# Patient Record
Sex: MALE | Race: BLACK OR AFRICAN AMERICAN | Employment: UNEMPLOYED | URBAN - METROPOLITAN AREA
[De-identification: names, ages, dates, MRNs, and addresses within clinical notes are randomized per-mention and may not be internally consistent; named-entity substitution may affect disease eponyms.]

---

## 2021-07-24 ENCOUNTER — ANCILLARY PROCEDURE (OUTPATIENT)
Dept: GENERAL RADIOLOGY | Facility: CLINIC | Age: 61
End: 2021-07-24
Attending: FAMILY MEDICINE

## 2021-07-24 ENCOUNTER — OFFICE VISIT (OUTPATIENT)
Dept: URGENT CARE | Facility: URGENT CARE | Age: 61
End: 2021-07-24

## 2021-07-24 VITALS
OXYGEN SATURATION: 100 % | SYSTOLIC BLOOD PRESSURE: 146 MMHG | TEMPERATURE: 98.4 F | DIASTOLIC BLOOD PRESSURE: 80 MMHG | HEART RATE: 69 BPM | WEIGHT: 170 LBS

## 2021-07-24 DIAGNOSIS — M25.511 RIGHT SHOULDER PAIN, UNSPECIFIED CHRONICITY: ICD-10-CM

## 2021-07-24 DIAGNOSIS — E11.9 TYPE 2 DIABETES, HBA1C GOAL < 7% (H): ICD-10-CM

## 2021-07-24 DIAGNOSIS — R07.81 RIB PAIN: ICD-10-CM

## 2021-07-24 DIAGNOSIS — R07.9 CHEST PAIN, UNSPECIFIED TYPE: ICD-10-CM

## 2021-07-24 DIAGNOSIS — I10 BENIGN ESSENTIAL HYPERTENSION: ICD-10-CM

## 2021-07-24 DIAGNOSIS — E78.5 HYPERLIPIDEMIA LDL GOAL <100: ICD-10-CM

## 2021-07-24 DIAGNOSIS — M94.0 COSTOCHONDRITIS: ICD-10-CM

## 2021-07-24 PROCEDURE — 71101 X-RAY EXAM UNILAT RIBS/CHEST: CPT | Mod: 59 | Performed by: RADIOLOGY

## 2021-07-24 PROCEDURE — 73030 X-RAY EXAM OF SHOULDER: CPT | Mod: RT | Performed by: RADIOLOGY

## 2021-07-24 PROCEDURE — 99203 OFFICE O/P NEW LOW 30 MIN: CPT | Performed by: FAMILY MEDICINE

## 2021-07-24 PROCEDURE — 93000 ELECTROCARDIOGRAM COMPLETE: CPT | Performed by: FAMILY MEDICINE

## 2021-07-24 RX ORDER — ATORVASTATIN CALCIUM 10 MG/1
10 TABLET, FILM COATED ORAL DAILY
Status: ON HOLD | COMMUNITY
Start: 2021-07-24 | End: 2021-07-27

## 2021-07-24 RX ORDER — CYCLOBENZAPRINE HCL 5 MG
5 TABLET ORAL
Qty: 20 TABLET | Refills: 0 | Status: SHIPPED | OUTPATIENT
Start: 2021-07-24

## 2021-07-24 RX ORDER — METOPROLOL TARTRATE 50 MG
50 TABLET ORAL 2 TIMES DAILY
Status: ON HOLD | COMMUNITY
Start: 2021-07-24 | End: 2021-07-27

## 2021-07-24 NOTE — PROGRESS NOTES
Patient presents with:  RIGHT CHEST PAIN: CHEST PRESSURE IN RIGHT SIDE NOW PAIN RADIATING DOWN HIS RT ARM TO FORE ARM BLOOD PRESSURE IS ELEVATED PT WAS GIVEN ASA AND BLOOD PRESSURE WAS BETTER BUT STILL ELEVATED YESTERDAY AND PATIENT IS STILL HAVING SXS WITH BLOOD PRESSURE ELEVATION PT IS TAKING  MEDICATION WHICH IS PRESCRIBED THROUGH [place] LOTS OF THESE MEDICATION AND WITH OTHER VITAMIN COMBINATIONS AND OTHER DOSING THAT JAMES DO NOT HAVE        Subjective     Sabino Jerry is a 60 year old male who presents to clinic today for the following health issues:    HPI     Chest Pain      Onset: yesterday am     Description (location/character/radiation/duration): pain in right side of chest     No history of in past , no h/o injury or heavy lifiting     Checked bp at home -164/92-before he took his bp medication     Intensity:  mild    Accompanying signs and symptoms:        Shortness of breath: no        Sweating: no        Nausea/vomitting: no        Palpitations: no        Other (fevers/chills/cough/heartburn/lightheadedness): YES- cough for 3 months, no evaluation in [place]   Covid vaccine Pfizer, second dose may 1st week in [facility]    History (similar episodes/previous evaluation): None    Precipitating or alleviating factors:       Worse with exertion- with movement of arm on right: slightly worse with walking 6/10   Rest improves 0/10 at rest        Worse with breathing: no        Related to eating: no        Better with burping: no     Therapies tried and outcome: ASA 81 mg , took garlic and home medications       Patient is from [place], visiting with family, due to return to [place] next week ,son in law here with patient.  Declined , son in law is translating for patient     Patient Active Problem List   Diagnosis     Past Surgical History:   Procedure Laterality Date     CV HEART CATHETERIZATION WITH POSSIBLE INTERVENTION N/A 7/27/2021    Procedure: coronary angiogram;  Surgeon: Roberto  Joshua RAYMOND MD;  Location:  HEART CARDIAC CATH LAB     CV LEFT HEART CATH N/A 7/27/2021    Procedure: Left Heart Cath;  Surgeon: Joshua Mejia MD;  Location:  HEART CARDIAC CATH LAB     CV PCI ANGIOPLASTY N/A 7/27/2021    Procedure: Percutaneous Transluminal Angioplasty;  Surgeon: Joshua Mejia MD;  Location:  HEART CARDIAC CATH LAB       Social History     Tobacco Use     Smoking status: Never Smoker     Smokeless tobacco: Never Used   Substance Use Topics     Alcohol use: Not on file     No family history on file.        Current Outpatient Medications   Medication Sig Dispense Refill     cyclobenzaprine (FLEXERIL) 5 MG tablet Take 1 tablet (5 mg) by mouth at bedtime as needed, may repeat once for muscle spasms 20 tablet 0     diclofenac (VOLTAREN) 1 % topical gel Apply 2 g topically 4 times daily 100 g 0     aspirin (ASA) 81 MG EC tablet Take 1 tablet (81 mg) by mouth daily 30 tablet 0     clopidogrel (PLAVIX) 75 MG tablet Take 1 tablet (75 mg) by mouth daily 30 tablet 0     HOLD MEDICATION IN ORDER SET 1 each daily (Patient not taking: Reported on 8/3/2021)       isosorbide mononitrate (IMDUR) 30 MG 24 hr tablet Take 2 tablets (60 mg) by mouth daily 60 tablet 1     metFORMIN (GLUCOPHAGE) 850 MG tablet Take 1 tablet (850 mg) by mouth daily (with dinner)       metoprolol succinate ER (TOPROL-XL) 25 MG 24 hr tablet Take 1 tablet (25 mg) by mouth every evening 30 tablet 4     nitroGLYcerin (NITROSTAT) 0.4 MG sublingual tablet For chest pain place 1 tablet under the tongue every 5 minutes for 3 doses. If symptoms persist 5 minutes after 1st dose call 911. 15 tablet 1     NONFORMULARY Take 1 tablet by mouth daily Pt's combo pill from [place]: atorvastatin 10 mg + fenofibrate 160 mg       NONFORMULARY Take 1 tablet by mouth daily Pt's combo pill from [place]: methylcobolamin 1500 mcg + alpha lipoic acid 100 mg + pyridoxine 3 mg + folic acid 1.5 mg       NONFORMULARY Take 1 tablet by mouth daily Pt's combo pill  from [place]: telmisartan 40 mg + metoprolol succinate 47.5 mg       Allergies   Allergen Reactions     Penicillins              ROS are negative, except as otherwise noted HPI    bp today without his bp meds       Objective    BP (!) 146/80   Pulse 69   Temp 98.4  F (36.9  C)   Wt 77.1 kg (170 lb)   SpO2 100%   There is no height or weight on file to calculate BMI.  Physical Exam   GENERAL: healthy, alert and no distress  NECK: no adenopathy, no asymmetry, masses, or scars and thyroid normal to palpation  RESP: lungs clear to auscultation - no rales, rhonchi or wheezes  Chest wall tenderness to palpation right side of chest, reproduces the pain  CV: regular rate and rhythm, normal S1 S2, no S3 or S4, no murmur, click or rub,   MS: no pretibial  edema   R shoulder-tender to palpation anterior joint and over AC joint but no deformity  reproduces pain, pain with abduction of shoulder, pain and decrease rom internal and external, no gross deformity of shoulder   NEURO: Normal strength and tone, mentation intact and speech normal, normal gait       Diagnostic Test Results:  Labs reviewed in Epic  Results for orders placed or performed in visit on 07/24/21   XR Shoulder Right G/E 3 Views     Status: None    Narrative    SHOULDER RIGHT THREE OR MORE VIEWS   7/24/2021 10:34 AM     HISTORY: Right shoulder pain, unspecified chronicity.    COMPARISON: None.      Impression    IMPRESSION: Mild-to-moderate acromioclavicular degenerative changes.  Glenohumeral joint is unremarkable. No evidence of acute fracture.    CELSO ASHLEY MD         SYSTEM ID:  [id number]   Results for orders placed or performed in visit on 07/24/21   XR Ribs & Chest Right G/E 3 Views     Status: None    Narrative    RIBS AND CHEST RIGHT THREE VIEWS  7/24/2021 10:31 AM     HISTORY: Right rib pain.    COMPARISON: None.      Impression    IMPRESSION: Cardiomediastinal silhouette is normal. Atherosclerotic  calcification of the aorta. No definite evidence  of rib fracture.  Surgical clips right upper quadrant likely due to previous  cholecystectomy.    CELSO ASHLEY MD         SYSTEM ID:  [id number]              EKG Interpretatio n:      Interpreted by Gwen Gee MD  Time reviewed:10:00  Symptoms at time of EKG: right sided chest wall and shoulder pain    Rhythm: Normal sinus   Rate: Normal  Ectopy Premature ventricular contractions   Conduction: Normal  ST Segments/ T Waves:  No acute  ST-T wave changes and No acute ischemic changes  Q Waves: None  Comparison to prior: No old EKG available    Clinical Impression: no acute changes        ASSESSMENT/PLAN:      ICD-10-CM    1. Right shoulder pain, unspecified chronicity  M25.511 XR Shoulder Right G/E 3 Views     diclofenac (VOLTAREN) 1 % topical gel     cyclobenzaprine (FLEXERIL) 5 MG tablet    mild to moc   2. Rib pain  R07.81 XR Ribs & Chest Right G/E 3 Views     cyclobenzaprine (FLEXERIL) 5 MG tablet   3. Costochondritis  M94.0    4. Chest pain  R07.9 EKG 12-lead complete w/read - Clinics   5. Benign essential hypertension  I10 DISCONTINUED: metoprolol tartrate (LOPRESSOR) 50 MG tablet   6. Type 2 diabetes, HbA1c goal < 7% (H)  E11.9    7. Hyperlipidemia LDL goal <100  E78.5 DISCONTINUED: atorvastatin (LIPITOR) 10 MG tablet         Reviewed medication instructions and side effects. Follow up if experiences side effects.     I reviewed supportive care, otc meds to use if needed, expected course, and signs of concern.  Follow up as needed or if he does not improve within 1 -2 day(s) or if worsens in any way.  Reviewed red flag symptoms and is to go to the ER if experiences any of these.           On the day of the encounter, time spend on chart review, patient visit, review of testing, documentation and discussion with other providers was 40  minutes      Patient Instructions     Start voltaren gel to right shoulder rub into area of pain  4 times a day     Ok to use heat/ice to area which ever feel better to  help with pain    Take tylenol 1000 mg 3 times a day ( max of 3000 mg a day ) for pain take scheduled not as needed to try to get pain under control    For rib pain-heat/ice to area    Take the tylenol as noted, can try the voltaren gel to the area as well    For night time take the cyclobenzaprine-muscle relaxer, will make you sleepy at bedtime will help with pain in ribs and shoulder    If pain is worse, shortness of breath, pain on left side of chest, headaches to ER     Check blood pressures at least an hour or more after his blood pressure medications, if consistently over 140 on top and over 90 on bottom, keep follow up appointment with your son in law PCP     /blood pressure >180 on the top/>100 on the bottom, to ER     no better after a week, follow up in clinic    Patient Education     Shoulder Pain with Uncertain Cause   Shoulder pain can have many causes. Pain often comes from the structures that surround the shoulder joint. These are the joint capsule, ligaments, tendons, muscles, and bursa. Pain can also come from cartilage in the joint. Cartilage can become worn out or injured. It s important to know what s causing your pain so the healthcare provider can use the correct treatment. But sometimes it s difficult to find the exact cause of shoulder pain. You may need to see a specialist (orthopedist). You may also need special tests such as a CT scan or MRI. The provider may need to use special tools to look inside the joint (arthroscopy).  Shoulder pain can be treated with a sling or a device that keeps your shoulder from moving. You can take an anti-inflammatory medicine such as ibuprofen to ease pain. You may need to do special shoulder exercises. Follow up with a specialist if the pain is severe or doesn t go away after a few weeks.  Home care  Follow these tips when caring for yourself at home:    If a sling was given to you, leave it in place for the time advised by your healthcare provider. If you  aren t sure how long to wear it, ask for advice. If the sling becomes loose, adjust it so that your forearm is level with the ground. Your shoulder should feel well supported.    Put an ice pack on the injured area for 20 minutes every 1 to 2 hours the first day. You can make your own ice pack by putting ice cubes in a plastic bag. Wrap the bag in a thin towel. Continue with ice packs 3 to 4 times a day for the next 2 days. Then use the pack as needed to ease pain and swelling.    You may use acetaminophen or ibuprofen to control pain, unless another pain medicine was prescribed. If you have chronic liver or kidney disease, talk with your healthcare provider before using these medicines. Also talk with your provider if you ve ever had a stomach ulcer or digestive bleeding.    Shoulder pain may seem worse at night, when there is less to distract you from the pain. If you sleep on your side, try to keep weight off your painful shoulder. Propping pillows behind you may stop you from rolling over onto that shoulder during sleep.     Shoulder and elbow joints can become stiff if left in a sling for too long. You should start range of motion exercises about 7 to 10 days after the injury. Talk with your provider to find out what type of exercises to do and how soon to start.    You can take the sling off to shower or bathe.  Follow-up care  Follow up with your healthcare provider if you don t start to get better in the next 5 days.  When to seek medical advice  Call your healthcare provider right away if any of these occur:    Pain or swelling gets worse or continues for more than a few days    Your hand or fingers become cold, blue, numb, or tingly    Large amount of bruising on your shoulder or upper arm    Trouble moving your hand or fingers    Weakness in your hand or fingers    Your shoulder becomes stiff    It feels like your shoulder is popping out    You are less able to do your daily activities  [employer] last  reviewed this educational content on 1/1/2020 2000-2021 [employer]. All rights reserved. This information is not intended as a substitute for professional medical care. Always follow your healthcare professional's instructions.                 Patient Education     Chest Wall Pain: Costochondritis    The chest pain that you have had today is caused by costochondritis. This condition is caused by an inflammation of the cartilage joining your ribs to your breastbone. It's not caused by heart or lung problems. Your healthcare team has made sure that the chest pain you feel is not from a life threatening cause of chest pain such as heart attack, collapsed lung, blood clot in the lung, tear in the aorta, or esophageal rupture. The inflammation may have been brought on by a blow to the chest, lifting heavy objects, intense exercise, or an illness that made you cough and sneeze a lot. It often occurs during times of emotional stress. It can be painful, but it's not dangerous. It usually goes away in 1 to 2 weeks. But it may happen again. Rarely, a more serious condition may cause symptoms similar to costochondritis. That s why it s important to watch for the warning signs listed below.   Home care  Follow these guidelines when caring for yourself at home:    If you feel that emotional stress is a cause of your condition, try to figure out the sources of that stress. It may not be obvious. Learn ways to deal with the stress in your life. This can include regular exercise, muscle relaxation, meditation, or simply taking time out for yourself.    You may use acetaminophen, ibuprofen, or naproxen to control pain, unless another pain medicine was prescribed. If you have liver or kidney disease or ever had a stomach ulcer, talk with your healthcare provider before using these medicines.    You can also help ease pain by using a hot, wet compress or heating pad. Use this with or without a medicated skin cream  that helps relieves pain.    Do stretching exercise as advised by your provider. Typically rest is beneficial for the first few days. Avoid strenuous activity that worsens the pain.    Take any prescribed medicines as directed.  Follow-up care  Follow up with your healthcare provider, or as advised.   When to seek medical advice  Call your healthcare provider right away if any of these occur:    A change in the type of pain. Call if it feels different, becomes more serious, lasts longer, or spreads into your shoulder, arm, neck, jaw, or back.    Shortness of breath or pain gets worse when you breathe    Weakness, dizziness, or fainting    Cough with dark-colored sputum (phlegm) or blood    Abdominal pain    Dark red or black stools    Fever of 100.4 F (38 C) or higher, or as directed by your healthcare provider  Sean last reviewed this educational content on 6/1/2019 2000-2021 [employer]. All rights reserved. This information is not intended as a substitute for professional medical care. Always follow your healthcare professional's instructions.

## 2021-07-24 NOTE — PATIENT INSTRUCTIONS
Start voltaren gel to right shoulder rub into area of pain  4 times a day     Ok to use heat/ice to area which ever feel better to help with pain    Take tylenol 1000 mg 3 times a day ( max of 3000 mg a day ) for pain take scheduled not as needed to try to get pain under control    For rib pain-heat/ice to area    Take the tylenol as noted, can try the voltaren gel to the area as well    For night time take the cyclobenzaprine-muscle relaxer, will make you sleepy at bedtime will help with pain in ribs and shoulder    If pain is worse, shortness of breath, pain on left side of chest, headaches to ER     Check blood pressures at least an hour or more after his blood pressure medications, if consistently over 140 on top and over 90 on bottom, keep follow up appointment with your son in law PCP     /blood pressure >180 on the top/>100 on the bottom, to ER     no better after a week, follow up in clinic    Patient Education     Shoulder Pain with Uncertain Cause   Shoulder pain can have many causes. Pain often comes from the structures that surround the shoulder joint. These are the joint capsule, ligaments, tendons, muscles, and bursa. Pain can also come from cartilage in the joint. Cartilage can become worn out or injured. It s important to know what s causing your pain so the healthcare provider can use the correct treatment. But sometimes it s difficult to find the exact cause of shoulder pain. You may need to see a specialist (orthopedist). You may also need special tests such as a CT scan or MRI. The provider may need to use special tools to look inside the joint (arthroscopy).  Shoulder pain can be treated with a sling or a device that keeps your shoulder from moving. You can take an anti-inflammatory medicine such as ibuprofen to ease pain. You may need to do special shoulder exercises. Follow up with a specialist if the pain is severe or doesn t go away after a few weeks.  Home care  Follow these tips when  caring for yourself at home:    If a sling was given to you, leave it in place for the time advised by your healthcare provider. If you aren t sure how long to wear it, ask for advice. If the sling becomes loose, adjust it so that your forearm is level with the ground. Your shoulder should feel well supported.    Put an ice pack on the injured area for 20 minutes every 1 to 2 hours the first day. You can make your own ice pack by putting ice cubes in a plastic bag. Wrap the bag in a thin towel. Continue with ice packs 3 to 4 times a day for the next 2 days. Then use the pack as needed to ease pain and swelling.    You may use acetaminophen or ibuprofen to control pain, unless another pain medicine was prescribed. If you have chronic liver or kidney disease, talk with your healthcare provider before using these medicines. Also talk with your provider if you ve ever had a stomach ulcer or digestive bleeding.    Shoulder pain may seem worse at night, when there is less to distract you from the pain. If you sleep on your side, try to keep weight off your painful shoulder. Propping pillows behind you may stop you from rolling over onto that shoulder during sleep.     Shoulder and elbow joints can become stiff if left in a sling for too long. You should start range of motion exercises about 7 to 10 days after the injury. Talk with your provider to find out what type of exercises to do and how soon to start.    You can take the sling off to shower or bathe.  Follow-up care  Follow up with your healthcare provider if you don t start to get better in the next 5 days.  When to seek medical advice  Call your healthcare provider right away if any of these occur:    Pain or swelling gets worse or continues for more than a few days    Your hand or fingers become cold, blue, numb, or tingly    Large amount of bruising on your shoulder or upper arm    Trouble moving your hand or fingers    Weakness in your hand or fingers    Your  shoulder becomes stiff    It feels like your shoulder is popping out    You are less able to do your daily activities  [employer] last reviewed this educational content on 1/1/2020 2000-2021 [employer]. All rights reserved. This information is not intended as a substitute for professional medical care. Always follow your healthcare professional's instructions.                 Patient Education     Chest Wall Pain: Costochondritis    The chest pain that you have had today is caused by costochondritis. This condition is caused by an inflammation of the cartilage joining your ribs to your breastbone. It's not caused by heart or lung problems. Your healthcare team has made sure that the chest pain you feel is not from a life threatening cause of chest pain such as heart attack, collapsed lung, blood clot in the lung, tear in the aorta, or esophageal rupture. The inflammation may have been brought on by a blow to the chest, lifting heavy objects, intense exercise, or an illness that made you cough and sneeze a lot. It often occurs during times of emotional stress. It can be painful, but it's not dangerous. It usually goes away in 1 to 2 weeks. But it may happen again. Rarely, a more serious condition may cause symptoms similar to costochondritis. That s why it s important to watch for the warning signs listed below.   Home care  Follow these guidelines when caring for yourself at home:    If you feel that emotional stress is a cause of your condition, try to figure out the sources of that stress. It may not be obvious. Learn ways to deal with the stress in your life. This can include regular exercise, muscle relaxation, meditation, or simply taking time out for yourself.    You may use acetaminophen, ibuprofen, or naproxen to control pain, unless another pain medicine was prescribed. If you have liver or kidney disease or ever had a stomach ulcer, talk with your healthcare provider before using these  medicines.    You can also help ease pain by using a hot, wet compress or heating pad. Use this with or without a medicated skin cream that helps relieves pain.    Do stretching exercise as advised by your provider. Typically rest is beneficial for the first few days. Avoid strenuous activity that worsens the pain.    Take any prescribed medicines as directed.  Follow-up care  Follow up with your healthcare provider, or as advised.   When to seek medical advice  Call your healthcare provider right away if any of these occur:    A change in the type of pain. Call if it feels different, becomes more serious, lasts longer, or spreads into your shoulder, arm, neck, jaw, or back.    Shortness of breath or pain gets worse when you breathe    Weakness, dizziness, or fainting    Cough with dark-colored sputum (phlegm) or blood    Abdominal pain    Dark red or black stools    Fever of 100.4 F (38 C) or higher, or as directed by your healthcare provider  Sean last reviewed this educational content on 6/1/2019 2000-2021 [employer]. All rights reserved. This information is not intended as a substitute for professional medical care. Always follow your healthcare professional's instructions.

## 2021-07-27 ENCOUNTER — HOSPITAL ENCOUNTER (INPATIENT)
Facility: CLINIC | Age: 61
LOS: 1 days | Discharge: HOME | DRG: 251 | End: 2021-07-28
Attending: EMERGENCY MEDICINE | Admitting: INTERNAL MEDICINE

## 2021-07-27 ENCOUNTER — APPOINTMENT (OUTPATIENT)
Dept: CARDIOLOGY | Facility: CLINIC | Age: 61
DRG: 251 | End: 2021-07-27
Attending: EMERGENCY MEDICINE

## 2021-07-27 ENCOUNTER — APPOINTMENT (OUTPATIENT)
Dept: GENERAL RADIOLOGY | Facility: CLINIC | Age: 61
DRG: 251 | End: 2021-07-27
Attending: EMERGENCY MEDICINE

## 2021-07-27 DIAGNOSIS — I30.9 ACUTE PERICARDITIS, UNSPECIFIED TYPE: ICD-10-CM

## 2021-07-27 DIAGNOSIS — E11.00 TYPE 2 DIABETES MELLITUS WITH HYPEROSMOLARITY WITHOUT COMA, UNSPECIFIED WHETHER LONG TERM INSULIN USE (H): Primary | ICD-10-CM

## 2021-07-27 DIAGNOSIS — I21.4 NSTEMI (NON-ST ELEVATED MYOCARDIAL INFARCTION) (H): ICD-10-CM

## 2021-07-27 LAB
ACT BLD: 155 SECONDS (ref 74–150)
ACT BLD: 239 SECONDS (ref 74–150)
ACT BLD: 284 SECONDS (ref 74–150)
ANION GAP SERPL CALCULATED.3IONS-SCNC: 6 MMOL/L (ref 3–14)
ATRIAL RATE - MUSE: 69 BPM
BASOPHILS # BLD AUTO: 0.1 10E3/UL (ref 0–0.2)
BASOPHILS # BLD AUTO: 0.1 10E3/UL (ref 0–0.2)
BASOPHILS NFR BLD AUTO: 0 %
BASOPHILS NFR BLD AUTO: 1 %
BUN SERPL-MCNC: 24 MG/DL (ref 7–30)
CALCIUM SERPL-MCNC: 9.6 MG/DL (ref 8.5–10.1)
CHLORIDE BLD-SCNC: 105 MMOL/L (ref 94–109)
CHOLEST SERPL-MCNC: 148 MG/DL
CO2 SERPL-SCNC: 24 MMOL/L (ref 20–32)
CREAT SERPL-MCNC: 1.35 MG/DL (ref 0.66–1.25)
CRP SERPL-MCNC: 25.5 MG/L (ref 0–8)
D DIMER PPP FEU-MCNC: 0.32 UG/ML FEU (ref 0–0.5)
DIASTOLIC BLOOD PRESSURE - MUSE: NORMAL MMHG
EOSINOPHIL # BLD AUTO: 0.1 10E3/UL (ref 0–0.7)
EOSINOPHIL # BLD AUTO: 0.2 10E3/UL (ref 0–0.7)
EOSINOPHIL NFR BLD AUTO: 1 %
EOSINOPHIL NFR BLD AUTO: 2 %
ERYTHROCYTE [DISTWIDTH] IN BLOOD BY AUTOMATED COUNT: 14.5 % (ref 10–15)
ERYTHROCYTE [DISTWIDTH] IN BLOOD BY AUTOMATED COUNT: 14.6 % (ref 10–15)
ERYTHROCYTE [DISTWIDTH] IN BLOOD BY AUTOMATED COUNT: 14.6 % (ref 10–15)
ERYTHROCYTE [SEDIMENTATION RATE] IN BLOOD BY WESTERGREN METHOD: 18 MM/HR (ref 0–20)
FASTING STATUS PATIENT QL REPORTED: YES
GFR SERPL CREATININE-BSD FRML MDRD: 56 ML/MIN/1.73M2
GLUCOSE BLD-MCNC: 121 MG/DL (ref 70–99)
GLUCOSE BLDC GLUCOMTR-MCNC: 166 MG/DL (ref 70–99)
GLUCOSE BLDC GLUCOMTR-MCNC: 71 MG/DL (ref 70–99)
HCT VFR BLD AUTO: 34.7 % (ref 40–53)
HCT VFR BLD AUTO: 38.9 % (ref 40–53)
HCT VFR BLD AUTO: 40.8 % (ref 40–53)
HDLC SERPL-MCNC: 38 MG/DL
HGB BLD-MCNC: 10.9 G/DL (ref 13.3–17.7)
HGB BLD-MCNC: 12.3 G/DL (ref 13.3–17.7)
HGB BLD-MCNC: 12.9 G/DL (ref 13.3–17.7)
HOLD SPECIMEN: NORMAL
IMM GRANULOCYTES # BLD: 0 10E3/UL
IMM GRANULOCYTES # BLD: 0.1 10E3/UL
IMM GRANULOCYTES NFR BLD: 0 %
IMM GRANULOCYTES NFR BLD: 0 %
INTERPRETATION ECG - MUSE: NORMAL
LDLC SERPL CALC-MCNC: 89 MG/DL
LVEF ECHO: NORMAL
LYMPHOCYTES # BLD AUTO: 2 10E3/UL (ref 0.8–5.3)
LYMPHOCYTES # BLD AUTO: 2 10E3/UL (ref 0.8–5.3)
LYMPHOCYTES NFR BLD AUTO: 13 %
LYMPHOCYTES NFR BLD AUTO: 20 %
MCH RBC QN AUTO: 27.8 PG (ref 26.5–33)
MCH RBC QN AUTO: 27.9 PG (ref 26.5–33)
MCH RBC QN AUTO: 27.9 PG (ref 26.5–33)
MCHC RBC AUTO-ENTMCNC: 31.4 G/DL (ref 31.5–36.5)
MCHC RBC AUTO-ENTMCNC: 31.6 G/DL (ref 31.5–36.5)
MCHC RBC AUTO-ENTMCNC: 31.6 G/DL (ref 31.5–36.5)
MCV RBC AUTO: 88 FL (ref 78–100)
MCV RBC AUTO: 88 FL (ref 78–100)
MCV RBC AUTO: 89 FL (ref 78–100)
MONOCYTES # BLD AUTO: 0.8 10E3/UL (ref 0–1.3)
MONOCYTES # BLD AUTO: 1.1 10E3/UL (ref 0–1.3)
MONOCYTES NFR BLD AUTO: 7 %
MONOCYTES NFR BLD AUTO: 8 %
NEUTROPHILS # BLD AUTO: 12.7 10E3/UL (ref 1.6–8.3)
NEUTROPHILS # BLD AUTO: 6.8 10E3/UL (ref 1.6–8.3)
NEUTROPHILS NFR BLD AUTO: 69 %
NEUTROPHILS NFR BLD AUTO: 79 %
NONHDLC SERPL-MCNC: 110 MG/DL
NRBC # BLD AUTO: 0 10E3/UL
NRBC # BLD AUTO: 0 10E3/UL
NRBC BLD AUTO-RTO: 0 /100
NRBC BLD AUTO-RTO: 0 /100
P AXIS - MUSE: 22 DEGREES
PLATELET # BLD AUTO: 370 10E3/UL (ref 150–450)
PLATELET # BLD AUTO: 377 10E3/UL (ref 150–450)
PLATELET # BLD AUTO: 391 10E3/UL (ref 150–450)
POTASSIUM BLD-SCNC: 4.4 MMOL/L (ref 3.4–5.3)
PR INTERVAL - MUSE: 146 MS
QRS DURATION - MUSE: 82 MS
QT - MUSE: 390 MS
QTC - MUSE: 417 MS
R AXIS - MUSE: -3 DEGREES
RBC # BLD AUTO: 3.92 10E6/UL (ref 4.4–5.9)
RBC # BLD AUTO: 4.41 10E6/UL (ref 4.4–5.9)
RBC # BLD AUTO: 4.63 10E6/UL (ref 4.4–5.9)
SARS-COV-2 RNA RESP QL NAA+PROBE: NEGATIVE
SODIUM SERPL-SCNC: 135 MMOL/L (ref 133–144)
SYSTOLIC BLOOD PRESSURE - MUSE: NORMAL MMHG
T AXIS - MUSE: 5 DEGREES
TRIGL SERPL-MCNC: 105 MG/DL
TROPONIN I SERPL-MCNC: 3.24 UG/L (ref 0–0.04)
TROPONIN I SERPL-MCNC: 3.44 UG/L (ref 0–0.04)
TROPONIN I SERPL-MCNC: 3.63 UG/L (ref 0–0.04)
TROPONIN I SERPL-MCNC: 3.71 UG/L (ref 0–0.04)
VENTRICULAR RATE- MUSE: 69 BPM
WBC # BLD AUTO: 10 10E3/UL (ref 4–11)
WBC # BLD AUTO: 10.9 10E3/UL (ref 4–11)
WBC # BLD AUTO: 16 10E3/UL (ref 4–11)

## 2021-07-27 PROCEDURE — 96376 TX/PRO/DX INJ SAME DRUG ADON: CPT

## 2021-07-27 PROCEDURE — 250N000013 HC RX MED GY IP 250 OP 250 PS 637: Performed by: PHYSICIAN ASSISTANT

## 2021-07-27 PROCEDURE — C9803 HOPD COVID-19 SPEC COLLECT: HCPCS

## 2021-07-27 PROCEDURE — 85027 COMPLETE CBC AUTOMATED: CPT | Performed by: INTERNAL MEDICINE

## 2021-07-27 PROCEDURE — 93308 TTE F-UP OR LMTD: CPT | Mod: 26 | Performed by: INTERNAL MEDICINE

## 2021-07-27 PROCEDURE — 36415 COLL VENOUS BLD VENIPUNCTURE: CPT | Performed by: PHYSICIAN ASSISTANT

## 2021-07-27 PROCEDURE — C1769 GUIDE WIRE: HCPCS | Performed by: INTERNAL MEDICINE

## 2021-07-27 PROCEDURE — 36415 COLL VENOUS BLD VENIPUNCTURE: CPT | Performed by: INTERNAL MEDICINE

## 2021-07-27 PROCEDURE — 85025 COMPLETE CBC W/AUTO DIFF WBC: CPT | Performed by: PHYSICIAN ASSISTANT

## 2021-07-27 PROCEDURE — 84484 ASSAY OF TROPONIN QUANT: CPT | Performed by: INTERNAL MEDICINE

## 2021-07-27 PROCEDURE — 250N000013 HC RX MED GY IP 250 OP 250 PS 637: Performed by: INTERNAL MEDICINE

## 2021-07-27 PROCEDURE — 93325 DOPPLER ECHO COLOR FLOW MAPG: CPT | Mod: 26 | Performed by: INTERNAL MEDICINE

## 2021-07-27 PROCEDURE — 87635 SARS-COV-2 COVID-19 AMP PRB: CPT | Performed by: EMERGENCY MEDICINE

## 2021-07-27 PROCEDURE — 99223 1ST HOSP IP/OBS HIGH 75: CPT | Mod: AI | Performed by: PHYSICIAN ASSISTANT

## 2021-07-27 PROCEDURE — 250N000011 HC RX IP 250 OP 636: Performed by: INTERNAL MEDICINE

## 2021-07-27 PROCEDURE — C1887 CATHETER, GUIDING: HCPCS | Performed by: INTERNAL MEDICINE

## 2021-07-27 PROCEDURE — 36415 COLL VENOUS BLD VENIPUNCTURE: CPT | Performed by: EMERGENCY MEDICINE

## 2021-07-27 PROCEDURE — 120N000001 HC R&B MED SURG/OB

## 2021-07-27 PROCEDURE — 85379 FIBRIN DEGRADATION QUANT: CPT | Performed by: EMERGENCY MEDICINE

## 2021-07-27 PROCEDURE — 80061 LIPID PANEL: CPT | Performed by: INTERNAL MEDICINE

## 2021-07-27 PROCEDURE — 71046 X-RAY EXAM CHEST 2 VIEWS: CPT

## 2021-07-27 PROCEDURE — 86140 C-REACTIVE PROTEIN: CPT | Performed by: EMERGENCY MEDICINE

## 2021-07-27 PROCEDURE — 80048 BASIC METABOLIC PNL TOTAL CA: CPT | Performed by: EMERGENCY MEDICINE

## 2021-07-27 PROCEDURE — 99152 MOD SED SAME PHYS/QHP 5/>YRS: CPT | Mod: 59 | Performed by: INTERNAL MEDICINE

## 2021-07-27 PROCEDURE — B2111ZZ FLUOROSCOPY OF MULTIPLE CORONARY ARTERIES USING LOW OSMOLAR CONTRAST: ICD-10-PCS | Performed by: INTERNAL MEDICINE

## 2021-07-27 PROCEDURE — 99223 1ST HOSP IP/OBS HIGH 75: CPT | Mod: 25 | Performed by: INTERNAL MEDICINE

## 2021-07-27 PROCEDURE — 85652 RBC SED RATE AUTOMATED: CPT | Performed by: EMERGENCY MEDICINE

## 2021-07-27 PROCEDURE — 93458 L HRT ARTERY/VENTRICLE ANGIO: CPT | Mod: 26 | Performed by: INTERNAL MEDICINE

## 2021-07-27 PROCEDURE — 85347 COAGULATION TIME ACTIVATED: CPT

## 2021-07-27 PROCEDURE — 250N000009 HC RX 250: Performed by: INTERNAL MEDICINE

## 2021-07-27 PROCEDURE — 84484 ASSAY OF TROPONIN QUANT: CPT | Performed by: PHYSICIAN ASSISTANT

## 2021-07-27 PROCEDURE — 99285 EMERGENCY DEPT VISIT HI MDM: CPT | Mod: 25

## 2021-07-27 PROCEDURE — 250N000011 HC RX IP 250 OP 636: Performed by: EMERGENCY MEDICINE

## 2021-07-27 PROCEDURE — 93321 DOPPLER ECHO F-UP/LMTD STD: CPT | Mod: 26 | Performed by: INTERNAL MEDICINE

## 2021-07-27 PROCEDURE — 272N000001 HC OR GENERAL SUPPLY STERILE: Performed by: INTERNAL MEDICINE

## 2021-07-27 PROCEDURE — 96366 THER/PROPH/DIAG IV INF ADDON: CPT

## 2021-07-27 PROCEDURE — 93308 TTE F-UP OR LMTD: CPT

## 2021-07-27 PROCEDURE — 92920 PRQ TRLUML C ANGIOP 1ART&/BR: CPT | Performed by: INTERNAL MEDICINE

## 2021-07-27 PROCEDURE — 93458 L HRT ARTERY/VENTRICLE ANGIO: CPT | Performed by: INTERNAL MEDICINE

## 2021-07-27 PROCEDURE — 84484 ASSAY OF TROPONIN QUANT: CPT | Performed by: EMERGENCY MEDICINE

## 2021-07-27 PROCEDURE — 92920 PRQ TRLUML C ANGIOP 1ART&/BR: CPT | Mod: LD | Performed by: INTERNAL MEDICINE

## 2021-07-27 PROCEDURE — 93325 DOPPLER ECHO COLOR FLOW MAPG: CPT

## 2021-07-27 PROCEDURE — 99152 MOD SED SAME PHYS/QHP 5/>YRS: CPT | Performed by: INTERNAL MEDICINE

## 2021-07-27 PROCEDURE — 85025 COMPLETE CBC W/AUTO DIFF WBC: CPT | Performed by: EMERGENCY MEDICINE

## 2021-07-27 PROCEDURE — 02703ZZ DILATION OF CORONARY ARTERY, ONE ARTERY, PERCUTANEOUS APPROACH: ICD-10-PCS | Performed by: INTERNAL MEDICINE

## 2021-07-27 PROCEDURE — 96365 THER/PROPH/DIAG IV INF INIT: CPT

## 2021-07-27 PROCEDURE — 99153 MOD SED SAME PHYS/QHP EA: CPT | Performed by: INTERNAL MEDICINE

## 2021-07-27 PROCEDURE — 4A023N7 MEASUREMENT OF CARDIAC SAMPLING AND PRESSURE, LEFT HEART, PERCUTANEOUS APPROACH: ICD-10-PCS | Performed by: INTERNAL MEDICINE

## 2021-07-27 PROCEDURE — 255N000002 HC RX 255 OP 636: Performed by: EMERGENCY MEDICINE

## 2021-07-27 PROCEDURE — C1725 CATH, TRANSLUMIN NON-LASER: HCPCS | Performed by: INTERNAL MEDICINE

## 2021-07-27 PROCEDURE — C1760 CLOSURE DEV, VASC: HCPCS | Performed by: INTERNAL MEDICINE

## 2021-07-27 PROCEDURE — 250N000013 HC RX MED GY IP 250 OP 250 PS 637: Performed by: EMERGENCY MEDICINE

## 2021-07-27 PROCEDURE — 93005 ELECTROCARDIOGRAM TRACING: CPT

## 2021-07-27 RX ORDER — SODIUM CHLORIDE 9 MG/ML
INJECTION, SOLUTION INTRAVENOUS CONTINUOUS
Status: ACTIVE | OUTPATIENT
Start: 2021-07-27 | End: 2021-07-28

## 2021-07-27 RX ORDER — HEPARIN SODIUM 1000 [USP'U]/ML
INJECTION, SOLUTION INTRAVENOUS; SUBCUTANEOUS
Status: DISCONTINUED
Start: 2021-07-27 | End: 2021-07-27 | Stop reason: HOSPADM

## 2021-07-27 RX ORDER — ASPIRIN 81 MG/1
81 TABLET ORAL DAILY
Status: DISCONTINUED | OUTPATIENT
Start: 2021-07-28 | End: 2021-07-28 | Stop reason: HOSPADM

## 2021-07-27 RX ORDER — HEPARIN SODIUM 10000 [USP'U]/100ML
0-5000 INJECTION, SOLUTION INTRAVENOUS CONTINUOUS
Status: DISCONTINUED | OUTPATIENT
Start: 2021-07-27 | End: 2021-07-27

## 2021-07-27 RX ORDER — DEXTROSE MONOHYDRATE 25 G/50ML
25-50 INJECTION, SOLUTION INTRAVENOUS
Status: DISCONTINUED | OUTPATIENT
Start: 2021-07-27 | End: 2021-07-28 | Stop reason: HOSPADM

## 2021-07-27 RX ORDER — NALOXONE HYDROCHLORIDE 0.4 MG/ML
0.4 INJECTION, SOLUTION INTRAMUSCULAR; INTRAVENOUS; SUBCUTANEOUS
Status: ACTIVE | OUTPATIENT
Start: 2021-07-27 | End: 2021-07-28

## 2021-07-27 RX ORDER — HYDRALAZINE HYDROCHLORIDE 20 MG/ML
10 INJECTION INTRAMUSCULAR; INTRAVENOUS EVERY 4 HOURS PRN
Status: DISCONTINUED | OUTPATIENT
Start: 2021-07-27 | End: 2021-07-28 | Stop reason: HOSPADM

## 2021-07-27 RX ORDER — CLOPIDOGREL BISULFATE 75 MG/1
75 TABLET ORAL DAILY
Status: DISCONTINUED | OUTPATIENT
Start: 2021-07-28 | End: 2021-07-28 | Stop reason: HOSPADM

## 2021-07-27 RX ORDER — AMOXICILLIN 250 MG
1 CAPSULE ORAL 2 TIMES DAILY PRN
Status: DISCONTINUED | OUTPATIENT
Start: 2021-07-27 | End: 2021-07-28 | Stop reason: HOSPADM

## 2021-07-27 RX ORDER — NALOXONE HYDROCHLORIDE 0.4 MG/ML
0.2 INJECTION, SOLUTION INTRAMUSCULAR; INTRAVENOUS; SUBCUTANEOUS
Status: ACTIVE | OUTPATIENT
Start: 2021-07-27 | End: 2021-07-28

## 2021-07-27 RX ORDER — MORPHINE SULFATE 2 MG/ML
1 INJECTION, SOLUTION INTRAMUSCULAR; INTRAVENOUS
Status: DISCONTINUED | OUTPATIENT
Start: 2021-07-27 | End: 2021-07-28 | Stop reason: HOSPADM

## 2021-07-27 RX ORDER — ASPIRIN 81 MG/1
324 TABLET, CHEWABLE ORAL ONCE
Status: DISCONTINUED | OUTPATIENT
Start: 2021-07-27 | End: 2021-07-27

## 2021-07-27 RX ORDER — OXYCODONE HYDROCHLORIDE 5 MG/1
5 TABLET ORAL ONCE
Status: COMPLETED | OUTPATIENT
Start: 2021-07-27 | End: 2021-07-27

## 2021-07-27 RX ORDER — CLOPIDOGREL BISULFATE 75 MG/1
300 TABLET ORAL ONCE
Status: COMPLETED | OUTPATIENT
Start: 2021-07-27 | End: 2021-07-27

## 2021-07-27 RX ORDER — ACETAMINOPHEN 325 MG/1
650 TABLET ORAL EVERY 4 HOURS PRN
Status: DISCONTINUED | OUTPATIENT
Start: 2021-07-27 | End: 2021-07-27

## 2021-07-27 RX ORDER — NALOXONE HYDROCHLORIDE 0.4 MG/ML
0.2 INJECTION, SOLUTION INTRAMUSCULAR; INTRAVENOUS; SUBCUTANEOUS
Status: DISCONTINUED | OUTPATIENT
Start: 2021-07-27 | End: 2021-07-27

## 2021-07-27 RX ORDER — ONDANSETRON 4 MG/1
4 TABLET, ORALLY DISINTEGRATING ORAL EVERY 6 HOURS PRN
Status: DISCONTINUED | OUTPATIENT
Start: 2021-07-27 | End: 2021-07-27

## 2021-07-27 RX ORDER — OXYCODONE HYDROCHLORIDE 5 MG/1
10 TABLET ORAL EVERY 4 HOURS PRN
Status: DISCONTINUED | OUTPATIENT
Start: 2021-07-27 | End: 2021-07-28 | Stop reason: HOSPADM

## 2021-07-27 RX ORDER — METOPROLOL TARTRATE 25 MG/1
25 TABLET, FILM COATED ORAL 2 TIMES DAILY
Status: DISCONTINUED | OUTPATIENT
Start: 2021-07-27 | End: 2021-07-28

## 2021-07-27 RX ORDER — FENTANYL CITRATE 50 UG/ML
25 INJECTION, SOLUTION INTRAMUSCULAR; INTRAVENOUS
Status: DISCONTINUED | OUTPATIENT
Start: 2021-07-27 | End: 2021-07-28 | Stop reason: HOSPADM

## 2021-07-27 RX ORDER — NITROGLYCERIN 5 MG/ML
VIAL (ML) INTRAVENOUS
Status: DISCONTINUED
Start: 2021-07-27 | End: 2021-07-27 | Stop reason: HOSPADM

## 2021-07-27 RX ORDER — METOPROLOL TARTRATE 25 MG/1
25 TABLET, FILM COATED ORAL 2 TIMES DAILY
Status: DISCONTINUED | OUTPATIENT
Start: 2021-07-27 | End: 2021-07-27

## 2021-07-27 RX ORDER — NITROGLYCERIN 5 MG/ML
VIAL (ML) INTRAVENOUS
Status: DISCONTINUED | OUTPATIENT
Start: 2021-07-27 | End: 2021-07-27 | Stop reason: HOSPADM

## 2021-07-27 RX ORDER — OXYCODONE HYDROCHLORIDE 5 MG/1
5 TABLET ORAL EVERY 4 HOURS PRN
Status: DISCONTINUED | OUTPATIENT
Start: 2021-07-27 | End: 2021-07-27

## 2021-07-27 RX ORDER — ONDANSETRON 4 MG/1
4 TABLET, ORALLY DISINTEGRATING ORAL EVERY 6 HOURS PRN
Status: DISCONTINUED | OUTPATIENT
Start: 2021-07-27 | End: 2021-07-28 | Stop reason: HOSPADM

## 2021-07-27 RX ORDER — ROSUVASTATIN CALCIUM 20 MG/1
40 TABLET, COATED ORAL AT BEDTIME
Status: DISCONTINUED | OUTPATIENT
Start: 2021-07-27 | End: 2021-07-28 | Stop reason: HOSPADM

## 2021-07-27 RX ORDER — NITROGLYCERIN 0.4 MG/1
0.4 TABLET SUBLINGUAL EVERY 5 MIN PRN
Status: DISCONTINUED | OUTPATIENT
Start: 2021-07-27 | End: 2021-07-28 | Stop reason: HOSPADM

## 2021-07-27 RX ORDER — ACETAMINOPHEN 650 MG/1
650 SUPPOSITORY RECTAL EVERY 6 HOURS PRN
Status: DISCONTINUED | OUTPATIENT
Start: 2021-07-27 | End: 2021-07-28 | Stop reason: HOSPADM

## 2021-07-27 RX ORDER — METOPROLOL TARTRATE 50 MG
50 TABLET ORAL 2 TIMES DAILY
Status: DISCONTINUED | OUTPATIENT
Start: 2021-07-27 | End: 2021-07-27

## 2021-07-27 RX ORDER — ONDANSETRON 2 MG/ML
4 INJECTION INTRAMUSCULAR; INTRAVENOUS EVERY 6 HOURS PRN
Status: DISCONTINUED | OUTPATIENT
Start: 2021-07-27 | End: 2021-07-28 | Stop reason: HOSPADM

## 2021-07-27 RX ORDER — ASPIRIN 81 MG/1
81 TABLET, CHEWABLE ORAL DAILY
Status: DISCONTINUED | OUTPATIENT
Start: 2021-07-28 | End: 2021-07-27

## 2021-07-27 RX ORDER — ASPIRIN 325 MG
325 TABLET ORAL ONCE
Status: DISCONTINUED | OUTPATIENT
Start: 2021-07-27 | End: 2021-07-27

## 2021-07-27 RX ORDER — AMOXICILLIN 250 MG
2 CAPSULE ORAL 2 TIMES DAILY PRN
Status: DISCONTINUED | OUTPATIENT
Start: 2021-07-27 | End: 2021-07-28 | Stop reason: HOSPADM

## 2021-07-27 RX ORDER — IOPAMIDOL 755 MG/ML
INJECTION, SOLUTION INTRAVASCULAR
Status: DISCONTINUED | OUTPATIENT
Start: 2021-07-27 | End: 2021-07-27 | Stop reason: HOSPADM

## 2021-07-27 RX ORDER — ACETAMINOPHEN 325 MG/1
650 TABLET ORAL EVERY 6 HOURS PRN
Status: DISCONTINUED | OUTPATIENT
Start: 2021-07-27 | End: 2021-07-28 | Stop reason: HOSPADM

## 2021-07-27 RX ORDER — SODIUM CHLORIDE 9 MG/ML
INJECTION, SOLUTION INTRAVENOUS CONTINUOUS
Status: CANCELLED | OUTPATIENT
Start: 2021-07-27

## 2021-07-27 RX ORDER — NALOXONE HYDROCHLORIDE 0.4 MG/ML
0.4 INJECTION, SOLUTION INTRAMUSCULAR; INTRAVENOUS; SUBCUTANEOUS
Status: DISCONTINUED | OUTPATIENT
Start: 2021-07-27 | End: 2021-07-27

## 2021-07-27 RX ORDER — FLUMAZENIL 0.1 MG/ML
0.2 INJECTION, SOLUTION INTRAVENOUS
Status: ACTIVE | OUTPATIENT
Start: 2021-07-27 | End: 2021-07-28

## 2021-07-27 RX ORDER — HEPARIN SODIUM 10000 [USP'U]/100ML
0-5000 INJECTION, SOLUTION INTRAVENOUS CONTINUOUS
Status: DISCONTINUED | OUTPATIENT
Start: 2021-07-27 | End: 2021-07-28

## 2021-07-27 RX ORDER — SODIUM CHLORIDE 9 MG/ML
INJECTION, SOLUTION INTRAVENOUS CONTINUOUS
Status: ACTIVE | OUTPATIENT
Start: 2021-07-27 | End: 2021-07-27

## 2021-07-27 RX ORDER — FENTANYL CITRATE 50 UG/ML
INJECTION, SOLUTION INTRAMUSCULAR; INTRAVENOUS
Status: DISCONTINUED | OUTPATIENT
Start: 2021-07-27 | End: 2021-07-27 | Stop reason: HOSPADM

## 2021-07-27 RX ORDER — LIDOCAINE 40 MG/G
CREAM TOPICAL
Status: CANCELLED | OUTPATIENT
Start: 2021-07-27

## 2021-07-27 RX ORDER — POTASSIUM CHLORIDE 1500 MG/1
20 TABLET, EXTENDED RELEASE ORAL
Status: CANCELLED | OUTPATIENT
Start: 2021-07-27

## 2021-07-27 RX ORDER — METOPROLOL TARTRATE 1 MG/ML
5-10 INJECTION, SOLUTION INTRAVENOUS
Status: DISCONTINUED | OUTPATIENT
Start: 2021-07-27 | End: 2021-07-28 | Stop reason: HOSPADM

## 2021-07-27 RX ORDER — ATROPINE SULFATE 0.1 MG/ML
0.5 INJECTION INTRAVENOUS
Status: ACTIVE | OUTPATIENT
Start: 2021-07-27 | End: 2021-07-28

## 2021-07-27 RX ORDER — LISINOPRIL 2.5 MG/1
2.5 TABLET ORAL DAILY
Status: DISCONTINUED | OUTPATIENT
Start: 2021-07-28 | End: 2021-07-28

## 2021-07-27 RX ORDER — LIDOCAINE 40 MG/G
CREAM TOPICAL
Status: DISCONTINUED | OUTPATIENT
Start: 2021-07-27 | End: 2021-07-28 | Stop reason: HOSPADM

## 2021-07-27 RX ORDER — NICOTINE POLACRILEX 4 MG
15-30 LOZENGE BUCCAL
Status: DISCONTINUED | OUTPATIENT
Start: 2021-07-27 | End: 2021-07-28 | Stop reason: HOSPADM

## 2021-07-27 RX ORDER — HEPARIN SODIUM 1000 [USP'U]/ML
INJECTION, SOLUTION INTRAVENOUS; SUBCUTANEOUS
Status: DISCONTINUED | OUTPATIENT
Start: 2021-07-27 | End: 2021-07-27 | Stop reason: HOSPADM

## 2021-07-27 RX ORDER — ISOSORBIDE MONONITRATE 30 MG/1
30 TABLET, EXTENDED RELEASE ORAL DAILY
Status: DISCONTINUED | OUTPATIENT
Start: 2021-07-27 | End: 2021-07-28 | Stop reason: HOSPADM

## 2021-07-27 RX ORDER — ASPIRIN 81 MG/1
243 TABLET, CHEWABLE ORAL ONCE
Status: DISCONTINUED | OUTPATIENT
Start: 2021-07-27 | End: 2021-07-27

## 2021-07-27 RX ORDER — OXYCODONE HYDROCHLORIDE 5 MG/1
5 TABLET ORAL EVERY 4 HOURS PRN
Status: DISCONTINUED | OUTPATIENT
Start: 2021-07-27 | End: 2021-07-28 | Stop reason: HOSPADM

## 2021-07-27 RX ORDER — ASPIRIN 81 MG/1
81 TABLET, CHEWABLE ORAL ONCE
Status: DISCONTINUED | OUTPATIENT
Start: 2021-07-27 | End: 2021-07-27

## 2021-07-27 RX ORDER — FENTANYL CITRATE 50 UG/ML
INJECTION, SOLUTION INTRAMUSCULAR; INTRAVENOUS
Status: DISCONTINUED
Start: 2021-07-27 | End: 2021-07-27 | Stop reason: HOSPADM

## 2021-07-27 RX ORDER — LORAZEPAM 0.5 MG/1
0.5 TABLET ORAL
Status: CANCELLED | OUTPATIENT
Start: 2021-07-27

## 2021-07-27 RX ORDER — ONDANSETRON 2 MG/ML
4 INJECTION INTRAMUSCULAR; INTRAVENOUS EVERY 6 HOURS PRN
Status: DISCONTINUED | OUTPATIENT
Start: 2021-07-27 | End: 2021-07-27

## 2021-07-27 RX ORDER — ASPIRIN 325 MG
325 TABLET ORAL ONCE
Status: COMPLETED | OUTPATIENT
Start: 2021-07-27 | End: 2021-07-27

## 2021-07-27 RX ORDER — LORAZEPAM 2 MG/ML
0.5 INJECTION INTRAMUSCULAR
Status: CANCELLED | OUTPATIENT
Start: 2021-07-27

## 2021-07-27 RX ORDER — LIDOCAINE HYDROCHLORIDE 10 MG/ML
INJECTION, SOLUTION EPIDURAL; INFILTRATION; INTRACAUDAL; PERINEURAL
Status: DISCONTINUED
Start: 2021-07-27 | End: 2021-07-27 | Stop reason: HOSPADM

## 2021-07-27 RX ADMIN — HEPARIN SODIUM 950 UNITS/HR: 10000 INJECTION, SOLUTION INTRAVENOUS at 11:13

## 2021-07-27 RX ADMIN — ROSUVASTATIN CALCIUM 40 MG: 20 TABLET, FILM COATED ORAL at 23:01

## 2021-07-27 RX ADMIN — HUMAN ALBUMIN MICROSPHERES AND PERFLUTREN 3 ML: 10; .22 INJECTION, SOLUTION INTRAVENOUS at 12:15

## 2021-07-27 RX ADMIN — HEPARIN SODIUM 950 UNITS/HR: 10000 INJECTION, SOLUTION INTRAVENOUS at 23:01

## 2021-07-27 RX ADMIN — METOPROLOL TARTRATE 25 MG: 25 TABLET, FILM COATED ORAL at 20:58

## 2021-07-27 RX ADMIN — CLOPIDOGREL BISULFATE 300 MG: 75 TABLET ORAL at 23:01

## 2021-07-27 RX ADMIN — ISOSORBIDE MONONITRATE 30 MG: 30 TABLET, EXTENDED RELEASE ORAL at 18:26

## 2021-07-27 RX ADMIN — ASPIRIN 325 MG ORAL TABLET 325 MG: 325 PILL ORAL at 10:00

## 2021-07-27 RX ADMIN — OXYCODONE HYDROCHLORIDE 5 MG: 5 TABLET ORAL at 23:05

## 2021-07-27 RX ADMIN — NITROGLYCERIN 0.4 MG: 0.4 TABLET SUBLINGUAL at 21:00

## 2021-07-27 RX ADMIN — OXYCODONE HYDROCHLORIDE 5 MG: 5 TABLET ORAL at 10:00

## 2021-07-27 ASSESSMENT — ENCOUNTER SYMPTOMS
SHORTNESS OF BREATH: 1
COUGH: 0
FATIGUE: 0
FEVER: 0
VOMITING: 0
NAUSEA: 0
PALPITATIONS: 0
CHILLS: 0

## 2021-07-27 ASSESSMENT — MIFFLIN-ST. JEOR: SCORE: 1538.81

## 2021-07-27 ASSESSMENT — ACTIVITIES OF DAILY LIVING (ADL): ADLS_ACUITY_SCORE: 17

## 2021-07-27 NOTE — ED TRIAGE NOTES
A&O x4, ABCs intact. Pt presents with right sided chest pain and SOB. Pt states that it hurts when he takes a deep breath.

## 2021-07-27 NOTE — ED NOTES
[facility]  ED Nurse Handoff Report    Sabino Jerry is a 61 year old male   ED Chief complaint: Chest Pain and Shortness of Breath  . ED Diagnosis:   Final diagnoses:   NSTEMI (non-ST elevated myocardial infarction) (H)   Acute pericarditis, unspecified type     Allergies:   Allergies   Allergen Reactions     Penicillins        Code Status: Full Code  Activity level - Baseline/Home:  Independent. Activity Level - Current:   Assist X 1. Lift room needed: No. Bariatric: No   Needed: Yes - Tamil, son-in-law here and interpreting.  Isolation: No. Infection: Not Applicable.     Vital Signs:   Vitals:    07/27/21 1140 07/27/21 1145 07/27/21 1150 07/27/21 1200   BP:  (!) 141/67  129/69   Pulse: 64 69 65 64   Resp:       Temp:       TempSrc:       SpO2: 100%  98% 100%   Weight:           Cardiac Rhythm:  ,   Cardiac  Cardiac Rhythm: Normal sinus rhythm  Pain level:    Patient confused: No. Patient Falls Risk: Yes.   Elimination Status: Due to void.    Patient Report - Initial Complaint: Chest pain. Focused Assessment: Pt presents to ED with anterior right chest pain that is worse with deep breaths. Throughout stay in ED, pain changed to epigastric chest pain. Troponin elevated at 3.632. Vitals have been stable throughout ED stay. Cardiac doctor here to speak with patient, and sounds like pt will have angiogram today. Pt A&OX4. Heparin gtt started in ED, and aspirin 324 mg and oxycodone given.  Of note, pt is visiting from [place].   Tests Performed: Labs, EKG, chest xray, echocardiogram. Abnormal Results:   Labs Ordered and Resulted from Time of ED Arrival Up to the Time of Departure from the ED   BASIC METABOLIC PANEL - Abnormal; Notable for the following components:       Result Value    Creatinine 1.35 (*)     Glucose 121 (*)     GFR Estimate 56 (*)     All other components within normal limits   TROPONIN I - Abnormal; Notable for the following components:    Troponin I 3.632 (*)     All  other components within normal limits   CBC WITH PLATELETS AND DIFFERENTIAL - Abnormal; Notable for the following components:    Hemoglobin 12.3 (*)     Hematocrit 38.9 (*)     All other components within normal limits   D DIMER QUANTITATIVE - Normal    Narrative:     This D-dimer assay is intended for use in conjunction with a clinical pretest probability assessment model to exclude pulmonary embolism (PE) and deep venous thrombosis (DVT) in outpatients suspected of PE or DVT. The cut-off value is 0.50 ug/mL FEU.   EXTRA BLUE TOP TUBE   EXTRA RED TOP TUBE   EXTRA GREEN TOP (LITHIUM HEPARIN) TUBE   CBC WITH PLATELETS   SARS-COV2 (COVID-19) VIRUS RT-PCR   CRP INFLAMMATION   ERYTHROCYTE SEDIMENTATION RATE AUTO   MEASURE WEIGHT   NOTIFY PHYSICIAN   NOTIFY PHYSICIAN   COVID-19 VIRUS (CORONAVIRUS) BY PCR    Narrative:     The following orders were created for panel order Asymptomatic COVID-19 Virus (Coronavirus) by PCR Nasopharyngeal.  Procedure                               Abnormality         Status                     ---------                               -----------         ------                     SARS-COV2 (COVID-19) Vir...[[id number]]                      In process                   Please view results for these tests on the individual orders.   CBC WITH PLATELETS & DIFFERENTIAL    Narrative:     The following orders were created for panel order CBC + differential.  Procedure                               Abnormality         Status                     ---------                               -----------         ------                     CBC with platelets and d...[[id number]]  Abnormal            Final result                 Please view results for these tests on the individual orders.   EXTRA TUBE    Narrative:     The following orders were created for panel order [place] Draw.  Procedure                               Abnormality         Status                     ---------                                -----------         ------                     Extra Blue Top Tube[[id number]]                              Final result               Extra Red Top Tube[[id number]]                               Final result               Extra Green Top (Lithium...[[id number]]                      Final result                 Please view results for these tests on the individual orders.     Chest XR,  PA & LAT   Preliminary Result   IMPRESSION: Mild opacity at the left lung base medially in the   retrocardiac region could be related to atelectasis and/or pneumonia.   Mild scarring and/or linear atelectasis in the right lower lung is   unchanged. The lungs are otherwise clear. Heart size and pulmonary   vascularity are within normal limits. Aortic calcification. No pleural   effusions.      Echocardiogram Limited    (Results Pending)   Cardiac Catheterization    (Results Pending)     .   Treatments provided: Heparin gtt, 324mg PO aspirin, PO oxycodone  Family Comments: Son-in-law here  OBS brochure/video discussed/provided to patient:  N/A  ED Medications:   Medications   heparin 25,000 units in 0.45% NaCl 250 mL ANTICOAGULANT infusion (950 Units/hr Intravenous New Bag 7/27/21 1113)   aspirin (ASA) tablet 325 mg (has no administration in time range)     Or   aspirin (ASA) chewable tablet 243 mg (has no administration in time range)   metoprolol tartrate (LOPRESSOR) tablet 25 mg (has no administration in time range)   rosuvastatin (CRESTOR) tablet 40 mg (has no administration in time range)   aspirin (ASA) tablet 325 mg (325 mg Oral Given 7/27/21 1000)   oxyCODONE (ROXICODONE) tablet 5 mg (5 mg Oral Given 7/27/21 1000)   heparin loading dose for LOW INTENSITY TREATMENT * Give BEFORE starting heparin infusion (4,700 Units Intravenous Given 7/27/21 1112)   perflutren diluted 1mL to 2mL with saline (OPTISON) diluted injection 3 mL (3 mLs Intravenous Given 7/27/21 1215)   sodium chloride (PF) 0.9% PF flush 10 mL (10 mLs Intracatheter Given  7/27/21 1216)     Drips infusing:  Yes - heparin  For the majority of the shift, the patient's behavior Green. Interventions performed were N/A.    Sepsis treatment initiated: No     Patient tested for COVID 19 prior to admission: YES    ED Nurse Name/Phone Number: Barbara Mo RN,   12:21 PM  RECEIVING UNIT ED HANDOFF REVIEW    Above ED Nurse Handoff Report was reviewed: Yes  Reviewed by: Monique Chavez RN on July 27, 2021 at 1:30 PM

## 2021-07-27 NOTE — CONSULTS
Cardiology Consultation:    Sabino Jerry MRN#: [id number]   YOB: 1960 Age: 61 year old     Date of Admission: 7/27/2021  Consult indication: chest pain, elevated troponin         Assessment and Plan / Recommendations:      # Elevated troponin, TnI 3.6, chest pain.  Overall clinical presentation is concerning for NSTEMI.  Patient has significant risk factors for coronary artery disease including obesity, gender, hypertension, hyperlipidemia, diabetes.  However there are some elements of the clinical history that raise suspicion for pericarditis/myopericarditis.    # HTN. BP elevated.  # HL  # DM2  # NICK  # Obesity    -Agree with aspirin, heparin gtt.  -Start metoprolol, rosuvastatin  -TTE  -ESR/CRP  -Discussed options for further evaluation and management including a conservative strategy of medical management, versus noninvasive stress testing, versus cardiac catheterization/coronary angiography and possible intervention.  Discussed the risks/benefits of each option at length.  Discussed with the patient, son-in-law, also the patient's daughter and wife over the phone.  After an in-depth discussion, the decision was made to proceed with cardiac catheterization/coronary angiography and possible intervention.  Discussed risks including but not limited to bleeding complications, stroke, heart attack, death, kidney injury.  Patient and family voiced understanding, and are in agreement with proceeding.   -Recommend consultation with Social Work regarding financial burden of this hospitalization, as [place]     Thank you for allowing our team to participate in the care of Sabino Jerry. Please do not hesitate to page me with any questions or concerns.    Kris Montague MD, [facility]  Cardiology  Text Page   July 27, 2021    Voice recognition software utilized. Although reviewed after completion, some word and grammatical errors may be present.            History of Present Illness:     I had the opportunity to see patient Sabino Jerry at [facility] for a cardiology consultation.     As you know, patient is a 61-year-old male with a past medical history significant for hypertension, hyperlipidemia, diabetes, who presents for further evaluation and management of chest pain.    Patient is visiting from [place], a licensed  was offered, however declined.  Patient designates his son-in-law to be the .    Patient reports that at baseline he is somewhat physically active.  He exercises by walking for about a mile 3-4 times a week.  Denies any recent decline in exertional capacity.  On 7/24/2021 he was seen by his family medicine physician with right-sided chest pain.  Last night, he started developing central chest pain, but has persisted into the morning.  This chest discomfort is worse with deep inspiration, as well as supine positioning.  He denies any associated diaphoresis, however does note some mild dyspnea.    He denies any recent upper respiratory infection symptoms, symptoms of nausea, vomiting diarrhea.  Denies any palpitations, presyncope/syncope.    In the emergency department, initial blood pressure was 144/78 mmHg, heart rate 70 bpm.  ECG demonstrates normal sinus rhythm, left axis deviation, normal intervals.  Mild T wave inversion in leads III and aVF.  Mild less than 0.5 mm ST segment elevation with CT depression in leads I, II, V4 through V6.    Initial labs are notable for potassium 4.4, creatinine 1.35, troponin 3.6.  D-dimer normal.  Chest x-ray demonstrates mild opacity in the left lung base in the retrocardiac region which could be related to atelectasis versus pneumonia.  There was aortic calcification noted.           Past Medical History:   I have reviewed this patient's past medical history  HTN  HL  DM2         Past Surgical History:   I have reviewed this patient's past surgical history   No prior cardiac surgical history           Social History:   I have reviewed this patient's social history  Social History     Tobacco Use     Smoking status: Never Smoker     Smokeless tobacco: Never Used   Substance Use Topics     Alcohol use: Not on file             Family History:   I have reviewed this patient's family history  No family history on file.          Allergies:   I have reviewed this patient's allergy history  Allergies   Allergen Reactions     Penicillins              Medications reviewed:   Prior to admission medications:  Prior to Admission medications    Medication Sig Start Date End Date Taking? Authorizing Provider   atorvastatin (LIPITOR) 10 MG tablet Take 1 tablet (10 mg) by mouth daily 7/24/21   Gwen Gee MD   cyclobenzaprine (FLEXERIL) 5 MG tablet Take 1 tablet (5 mg) by mouth at bedtime as needed, may repeat once for muscle spasms 7/24/21   Gwen Gee MD   diclofenac (VOLTAREN) 1 % topical gel Apply 2 g topically 4 times daily 7/24/21   Gwen Gee MD   FENOFIBRATE PO Take 160 mg by mouth daily In combination with atorvastatin     Reported, Patient   FOLIC ACID PO Take 1 mg by mouth daily    Reported, Patient   metFORMIN (GLUCOPHAGE) 850 MG tablet Take 850 mg by mouth daily (with dinner)    Reported, Patient   METHYLCOBALAMIN PO Take 1,500 mg by mouth Alpha lipoic acid 100 mg, pyridoxine 3 mg Folic acid 1.5 mg     Reported, Patient   metoprolol tartrate (LOPRESSOR) 50 MG tablet Take 1 tablet (50 mg) by mouth 2 times daily 7/24/21   Gwen Gee MD   TELMISARTAN PO Take 40 mg by mouth daily    Reported, Patient      Current medications:  Current Facility-Administered Medications Ordered in Epic   Medication Dose Route Frequency Last Rate Last Admin     aspirin (ASA) tablet 325 mg  325 mg Oral Once        Or     aspirin (ASA) chewable tablet 243 mg  243 mg Oral Once         heparin 25,000 units in 0.45% NaCl 250 mL ANTICOAGULANT infusion  0-5,000 Units/hr Intravenous Continuous 9.5 mL/hr at  21 1113 950 Units/hr at 21 1113     metoprolol tartrate (LOPRESSOR) tablet 25 mg  25 mg Oral BID         rosuvastatin (CRESTOR) tablet 40 mg  40 mg Oral At Bedtime         Current Outpatient Medications Ordered in Epic   Medication     atorvastatin (LIPITOR) 10 MG tablet     cyclobenzaprine (FLEXERIL) 5 MG tablet     diclofenac (VOLTAREN) 1 % topical gel     FENOFIBRATE PO     FOLIC ACID PO     metFORMIN (GLUCOPHAGE) 850 MG tablet     METHYLCOBALAMIN PO     metoprolol tartrate (LOPRESSOR) 50 MG tablet     TELMISARTAN PO             Review of Systems:   A complete review of systems was performed and was negative except as mentioned in the HPI.          Physical Exam:   Vital signs were personally reviewed:  Temperatures:  Current - Temp: 98.3  F (36.8  C); Max - Temp  Av.3  F (36.8  C)  Min: 98.3  F (36.8  C)  Max: 98.3  F (36.8  C)  Respiration range: Resp  Av  Min: 18  Max: 18  Pulse range: Pulse  Av.7  Min: 60  Max: 71  Blood pressure range: Systolic (24hrs), Av , Min:135 , Max:160   ; Diastolic (24hrs), Av, Min:72, Max:91    Pulse oximetry range: SpO2  Av.7 %  Min: 99 %  Max: 100 %  No intake or output data in the 24 hours ending 21 1145  173 lbs 4.5 oz    Constitutional: appears stated age, in no apparent distress, appears to be well nourished  Eyes: sclera anicteric, conjunctiva normal, no lesions on eyelids or lashes  ENT: normocephalic, without obvious abnormality, atraumatic, external ears without lesions,   Pulmonary: clear to auscultation bilaterally  Cardiovascular: JVP normal, regular rate, regular rhythm, normal S1 and S2, no S3, S4, no murmur appreciated, no lower extremity edema  Gastrointestinal: abdominal exam benign, non-tender, no rigidity, no guarding  Neurologic: awake, alert, face symmetrical, moves all extremities  Skin: no abnormal rashes or lesions on limited exam, nails normal without discoloration or clubbing, no jaundice  Psychiatric: affect  is normal, answers questions appropriately, oriented to self and place         Laboratory tests:   Laboratory tests personally reviewed:   CMP  Recent Labs   Lab 07/27/21  0933      POTASSIUM 4.4   CHLORIDE 105   CO2 24   ANIONGAP 6   *   BUN 24   CR 1.35*   GFRESTIMATED 56*   ALEC 9.6     CBC  Recent Labs   Lab 07/27/21  0933   WBC 10.0   RBC 4.41   HGB 12.3*   HCT 38.9*   MCV 88   MCH 27.9   MCHC 31.6   RDW 14.5        INRNo lab results found in last 7 days.  Lab Results   Component Value Date    TROPONIN 3.632 () 07/27/2021

## 2021-07-27 NOTE — H&P
[facility]    Hospitalist History and Physical    Name: Sabino Jerry    MRN: [id number]  YOB: 1960    Age: 61 year old  Date of Admission:  7/27/2021  Date of Service (when I saw the patient): 07/27/21    Assessment & Plan   Sabino Jerry is a 61 year old Tamil speaking male visiting family from [place] with PMH significant for DM2, HTN, and HLD who presents for evaluation of right sided chest pain.     ED workup reveals: hypertensive otherwise VSS, creatinine of 1.35, troponin of 3.632, glucose of 121, hemoglobin 12.3, initial EKG shows rate of 69 bpm normal sinus rhythm, left axis deviation, normal intervals, Mild T wave inversion in leads III and aVF, < 0.5 mm ST segment elevation with MT depression in leads I, II, V4 through V6 , D-dimer of 0.32, and chest x-ray shows mild opacity of the left lung base medially in the retrocardiac region could be related atelectasis and/or pneumonia, mild scarring in-or linear atelectasis in the right lower lung is unchanged, and no pleural effusion. Limited echocardiogram ordered and pending.    #Right sided chest pain, possible pericarditis/myocarditis  #NSTEMI: onset of right sided chest pain without radiation on 7/23. Pain improved and minimal as of 7/24 after being seen in  where he had right shoulder, ribs, and chest x-ray without signs of acute fracture or underlying cause for symptoms. Acute worsening the evening of 7/26 with pain becoming more central and epigastric in location without radiation. Associated dyspnea and increased pain with deep inhalation. Pain worse laying flat and improved sitting up or leaning forward. Initial EKG without significant ischemic changes. Initial troponin elevated at 3.632. D-dimer of 0.32 thus lower suspicion for PE. ED provider discussed case with Dr. Montague of cardiology who recommended IV Heparin gtt and further assessment with coronary angiogram this afternoon.   -monitor on  telemetry  -serial troponins  -continue IV Heparin gtt  - mg daily   -continue PTA Metoprolol and switch Atorvastatin to Rosuvastatin per cardiology   -ESR WNL and CRP of 25.5  -limited echocardiogram in ED shows preserved EF of 60-65% without WMA  -Cardiology consulted and saw patient in the ED, plan for coronary angiogram this afternoon     #NICK: creatinine of 1.35, unclear baseline or if patient has underlying CKD with h/o HTN and DM  -gentle IVF hydration  -recheck BMP in AM    #HTN: BP intermittently elevated in the ED  -continue PTA Metoprolol and Telmisartan once med rec complete     #DM2: reportedly well controlled based on last HgbA1c in 02/2021 prior to traveling to [place] from [facility]  -add on HgbA1c  -hold Metformin  -sliding scale insulin with meals after procedure     Awaiting formal med rec to be completed to reorder PTA medications.     Social: visiting from [facility],  consult to discuss finances related to stay   COVID: negative on 7/27/21  DVT Prophylaxis: on Heparin gtt  Code Status: Full Code, discussed with patient   Disposition: Expected stay >2 midnights, will admit to inpatient    Primary Care Physician   Received medical care in [facility] where patient is visiting from     Chief Complaint   Chest pain     History obtained from discussion with ED provider, Dr. Viera, chart review, and interview with patient with use of son in law at bedside as interpretor, declined licensed medical interpretor.      History of Present Illness   Sabino Jerry is a 61 year old male who presents with intermittent right-sided chest pain since last Friday (7/23).  He was initially seen at urgent care on 7/24 where he had a shoulder x-ray as well ribs and chest x-ray which were unremarkable for acute cause for his symptoms. At that time the patient was prescribed Flexeril for management. Since Saturday the patient has had minimal pain until last night where his pain acutely increased and became more central in  location.  He also describes epigastric discomfort.  Denies radiation into left arm, jaw, or shoulder.  He does not describe overt shortness of breath but mild dyspnea with deep inspiration as well increase chest discomfort.  He notes his pain is also worse with lying flat and has increased relief with sitting up or leaning forward.  Denies associated fever, chills, cough, nausea, vomiting, diarrhea, diaphoresis, lightheadedness, dizziness, headache, or lower extremity edema.  He has not had any associated palpitations or syncopal events.  Denies any prior episodes similar to this.  No prior history of CAD, MI, or atrial fibrillation.  He continues to have epigastric pain at the time of interview. Per son in law the patient had a complete physical in [place] prior to coming to visit. No significant family history of CAD.    Past Medical History    HTN  DM2  HLD    Past Surgical History   Surgical history reviewed with patient and noncontributory.     Prior to Admission Medications   Prior to Admission Medications   Prescriptions Last Dose Informant Patient Reported? Taking?   FENOFIBRATE PO   Yes No   Sig: Take 160 mg by mouth daily In combination with atorvastatin    FOLIC ACID PO   Yes No   Sig: Take 1 mg by mouth daily   METHYLCOBALAMIN PO   Yes No   Sig: Take 1,500 mg by mouth Alpha lipoic acid 100 mg, pyridoxine 3 mg Folic acid 1.5 mg    TELMISARTAN PO   Yes No   Sig: Take 40 mg by mouth daily   atorvastatin (LIPITOR) 10 MG tablet   Yes No   Sig: Take 1 tablet (10 mg) by mouth daily   cyclobenzaprine (FLEXERIL) 5 MG tablet   No No   Sig: Take 1 tablet (5 mg) by mouth at bedtime as needed, may repeat once for muscle spasms   diclofenac (VOLTAREN) 1 % topical gel   No No   Sig: Apply 2 g topically 4 times daily   metFORMIN (GLUCOPHAGE) 850 MG tablet   Yes No   Sig: Take 850 mg by mouth daily (with dinner)   metoprolol tartrate (LOPRESSOR) 50 MG tablet   Yes No   Sig: Take 1 tablet (50 mg) by mouth 2 times daily       Facility-Administered Medications: None     Allergies   Allergies   Allergen Reactions     Penicillins      Social History   Social History     Tobacco Use     Smoking status: Never Smoker     Smokeless tobacco: Never Used   Substance Use Topics     Alcohol use: Not on file     Social History     Social History Narrative     Not on file     Family History   Family history reviewed with patient and is noncontributory.    Review of Systems   A Comprehensive greater than 10 system review of systems was carried out.  Pertinent positives and negatives are noted above.  Otherwise negative for contributory information.    Physical Exam   Temp: 98.3  F (36.8  C) Temp src: Temporal BP: (!) 160/72 Pulse: 63   Resp: 18 SpO2: 100 % O2 Device: None (Room air)    Vital Signs with Ranges  Temp:  [98.3  F (36.8  C)] 98.3  F (36.8  C)  Pulse:  [60-71] 63  Resp:  [18] 18  BP: (135-160)/(72-91) 160/72  SpO2:  [99 %-100 %] 100 %  173 lbs 4.5 oz    GEN:  Alert, oriented x 3, appears mildly uncomfortable, no overt distress  HEENT:  Normocephalic/atraumatic, no scleral icterus, no nasal discharge, mouth moist.  CV:  Regular rate and rhythm, no murmur or JVD.  S1 + S2 noted, no S3 or S4.  LUNGS:  Clear to auscultation bilaterally without rales/rhonchi/wheezing/retractions.  Symmetric chest rise on inhalation noted.  ABD:  Active bowel sounds, soft, tender to palpation in epigastrium, non-distended.  No rebound/guarding/rigidity.  EXT:  No edema.  No cyanosis.  No acute joint synovitis noted.  SKIN:  Dry to touch, no exanthems noted in the visualized areas.  NEURO:  Symmetric muscle strength, sensation to touch grossly intact.  Coordination symmetric on general exam.  No new focal deficits appreciated.    Data   Data reviewed today:  I personally reviewed the EKG tracing showing rate of 69 bpm normal sinus rhythm, left axis deviation, normal intervals, Mild T wave inversion in leads III and aVF, < 0.5 mm ST segment elevation with CA  depression in leads I, II, V4 through V6.    Results for orders placed or performed during the hospital encounter of 07/27/21   Chest XR,  PA & LAT     Status: None (Preliminary result)    Narrative    CHEST TWO VIEWS  July 27, 2021 10:56 AM     HISTORY: Chest pain.    COMPARISON: 7/24/2021.      Impression    IMPRESSION: Mild opacity at the left lung base medially in the  retrocardiac region could be related to atelectasis and/or pneumonia.  Mild scarring and/or linear atelectasis in the right lower lung is  unchanged. The lungs are otherwise clear. Heart size and pulmonary  vascularity are within normal limits. Aortic calcification. No pleural  effusions.   Basic metabolic panel (BMP)     Status: Abnormal   Result Value Ref Range    Sodium 135 133 - 144 mmol/L    Potassium 4.4 3.4 - 5.3 mmol/L    Chloride 105 94 - 109 mmol/L    Carbon Dioxide (CO2) 24 20 - 32 mmol/L    Anion Gap 6 3 - 14 mmol/L    Urea Nitrogen 24 7 - 30 mg/dL    Creatinine 1.35 (H) 0.66 - 1.25 mg/dL    Calcium 9.6 8.5 - 10.1 mg/dL    Glucose 121 (H) 70 - 99 mg/dL    GFR Estimate 56 (L) >60 mL/min/1.73m2   Troponin I (now)     Status: Abnormal   Result Value Ref Range    Troponin I 3.632 (HH) 0.000 - 0.045 ug/L   CBC with platelets and differential     Status: Abnormal   Result Value Ref Range    WBC Count 10.0 4.0 - 11.0 10e3/uL    RBC Count 4.41 4.40 - 5.90 10e6/uL    Hemoglobin 12.3 (L) 13.3 - 17.7 g/dL    Hematocrit 38.9 (L) 40.0 - 53.0 %    MCV 88 78 - 100 fL    MCH 27.9 26.5 - 33.0 pg    MCHC 31.6 31.5 - 36.5 g/dL    RDW 14.5 10.0 - 15.0 %    Platelet Count 391 150 - 450 10e3/uL    % Neutrophils 69 %    % Lymphocytes 20 %    % Monocytes 8 %    % Eosinophils 2 %    % Basophils 1 %    % Immature Granulocytes 0 %    NRBCs per 100 WBC 0 <1 /100    Absolute Neutrophils 6.8 1.6 - 8.3 10e3/uL    Absolute Lymphocytes 2.0 0.8 - 5.3 10e3/uL    Absolute Monocytes 0.8 0.0 - 1.3 10e3/uL    Absolute Eosinophils 0.2 0.0 - 0.7 10e3/uL    Absolute Basophils  0.1 0.0 - 0.2 10e3/uL    Absolute Immature Granulocytes 0.0 <=0.0 10e3/uL    Absolute NRBCs 0.0 10e3/uL   Extra Blue Top Tube     Status: None   Result Value Ref Range    Hold Specimen JIC    Extra Red Top Tube     Status: None   Result Value Ref Range    Hold Specimen JIC    Extra Green Top (Lithium Heparin) Tube     Status: None   Result Value Ref Range    Hold Specimen JIC    D dimer quantitative     Status: Normal   Result Value Ref Range    D-Dimer Quantitative 0.32 0.00 - 0.50 ug/mL FEU    Narrative    This D-dimer assay is intended for use in conjunction with a clinical pretest probability assessment model to exclude pulmonary embolism (PE) and deep venous thrombosis (DVT) in outpatients suspected of PE or DVT. The cut-off value is 0.50 ug/mL FEU.   EKG 12-lead, tracing only     Status: None (Preliminary result)   Result Value Ref Range    Systolic Blood Pressure  mmHg    Diastolic Blood Pressure  mmHg    Ventricular Rate 69 BPM    Atrial Rate 69 BPM    TX Interval 146 ms    QRS Duration 82 ms     ms    QTc 417 ms    P Axis 22 degrees    R AXIS -3 degrees    T Axis 5 degrees    Interpretation ECG       Sinus rhythm  Nonspecific T wave abnormality  Abnormal ECG  No previous ECGs available     Asymptomatic COVID-19 Virus (Coronavirus) by PCR Nasopharyngeal     Status: None ()    Specimen: Nasopharyngeal; Swab    Narrative    The following orders were created for panel order Asymptomatic COVID-19 Virus (Coronavirus) by PCR Nasopharyngeal.  Procedure                               Abnormality         Status                     ---------                               -----------         ------                     SARS-COV2 (COVID-19) Vir...[[id number]]                                                   Please view results for these tests on the individual orders.   CBC + differential     Status: Abnormal    Narrative    The following orders were created for panel order CBC + differential.  Procedure                                Abnormality         Status                     ---------                               -----------         ------                     CBC with platelets and d...[[id number]]  Abnormal            Final result                 Please view results for these tests on the individual orders.   [facility]ena Draw *Canceled*     Status: None ()    Narrative    The following orders were created for panel order [facility] Draw.  Procedure                               Abnormality         Status                     ---------                               -----------         ------                       Please view results for these tests on the individual orders.   Buena Draw     Status: None    Narrative    The following orders were created for panel order [facility]ena Draw.  Procedure                               Abnormality         Status                     ---------                               -----------         ------                     Extra Blue Top Tube[[id number]]                              Final result               Extra Red Top Tube[[id number]]                               Final result               Extra Green Top (Lithium...[[id number]]                      Final result                 Please view results for these tests on the individual orders.     Maricruz GAO I discussed the patient with Dr. Timmons and he agrees with the above plan.

## 2021-07-27 NOTE — ED PROVIDER NOTES
History   Chief Complaint:  Chest Pain and Shortness of Breath     HPI   Sabino Jerry is a 61 year old male with history of Type II Diabetes, hypertension, and hyperlipidemia who presents with chest pain and shortness of breath.  Patient's son-in-law explains that patient has been experiencing some right-sided chest pain intermittently for the past few days.  He was originally evaluated at outside clinic and had an EKG performed that was nonischemic.  He was treated with conservative measures and told to follow-up if symptoms continued.  Unfortunately last night the patient had significant difficulty sleeping secondary to right-sided chest pain.  He reports that it is worse with laying flat and improved with sitting up.  Also describes it as pleuritic but not reproducible with tenderness.  He denies any exertional chest pain recently.  Patient's son-in-law reports no significant cardiac history.  No recent fevers, chills, nausea, vomiting, abdominal pain, diarrhea or urinary symptoms. Patient is visiting from [place], staying with his family.     Review of Systems   Constitutional: Negative for chills, fatigue and fever.   Respiratory: Positive for shortness of breath. Negative for cough.    Cardiovascular: Positive for chest pain. Negative for palpitations and leg swelling.   Gastrointestinal: Negative for nausea and vomiting.   All other systems reviewed and are negative.    Allergies:  Penicillins    Medications:  Lipitor  Flexeril   Fenofibrate   Metformin   Metoprolol  Telmisartan   Methylcobalamin     Past Medical History:    Type II Diabetes   Hypertension   Hyperlipidemia  Costochondritis    Social History:  Arrives to the emergency department with his son-in-law.   Patient is visiting from [place], staying with his family.     Physical Exam     Patient Vitals for the past 24 hrs:   BP Temp Temp src Pulse Resp SpO2 Weight   07/27/21 1200 129/69 -- -- 64 -- 100 % --   07/27/21 1150 -- -- -- 65 -- 98 % --    07/27/21 1145 (!) 141/67 -- -- 69 -- -- --   07/27/21 1140 -- -- -- 64 -- 100 % --   07/27/21 1130 (!) 143/69 -- -- 74 -- 100 % --   07/27/21 1115 (!) 145/81 -- -- 63 -- 100 % --   07/27/21 1105 -- -- -- 64 -- 100 % --   07/27/21 1100 (!) 160/72 -- -- 60 -- -- --   07/27/21 1045 (!) 148/91 -- -- 66 -- 100 % --   07/27/21 1030 (!) 150/73 -- -- 66 -- 100 % --   07/27/21 1015 (!) 144/80 -- -- 63 -- 100 % --   07/27/21 1005 (!) 145/76 -- -- 66 -- 99 % --   07/27/21 0945 (!) 160/85 -- -- 71 -- 99 % --   07/27/21 0930 (!) 147/78 -- -- 66 -- 99 % --   07/27/21 0915 135/76 -- -- 68 -- -- --   07/27/21 0908 (!) 144/78 98.3  F (36.8  C) Temporal 70 18 100 % 78.6 kg (173 lb 4.5 oz)       Physical Exam  General: Patient is awake, alert and interactive when I enter the room  Head: The scalp, face, and head appear normal  Eyes: The pupils are equal, round, and reactive to light. Conjunctivae and sclerae are normal  Neck: Normal range of motion.  CV: Regular rate and rhythm. Peripheral pulses including radial pulses are symmetric.   Resp: Lungs are clear without wheezes or rales. No respiratory distress.   GI: Abdomen is soft, no rigidity, guarding, or rebound. No distension. No tenderness to palpation in any quadrant.     MS: Chest wall is non tender to palpation. No asymmetric leg swelling, calf or thigh tenderness.    Skin: No rash or lesions noted. Normal capillary refill noted  Neuro: Speech is normal and fluent. Face is symmetric. Moving all extremities.   Psych:  Normal affect.  Appropriate interactions.    Emergency Department Course   ECG  ECG taken at 0916, ECG read at 0934  Normal sinus rhythm   Nonspecific T wave abnormality   Abnormal ECG  No change as compared to prior, dated 2/24/21.  Rate 69 bpm. VA interval 146 ms. QRS duration 82 ms. QT/QTc 390/417 ms. P-R-T axes 22 -3 5.     Imaging:  Chest XR,  PA & LAT  Mild opacity at the left lung base medially in the  retrocardiac region could be related to atelectasis  and/or pneumonia.  Mild scarring and/or linear atelectasis in the right lower lung is  unchanged. The lungs are otherwise clear. Heart size and pulmonary  vascularity are within normal limits. Aortic calcification. No pleural  effusions.    Laboratory:   CBC: WBC 10.0, HGB 12.3 (L),   BMP: Glucose 121 (H), Creatinine 1.35 (H), GFR 56 (L), o/w WNL    Troponin (Collected 093): 3.632 (VH)  D Dimer (Collected 0933): 0.32    CRP: Pending  ESR: Pending    Asymptomatic COVID19 Virus PCR by nasopharyngeal swab pending    Emergency Department Course:    Reviewed:  I reviewed nursing notes, vitals and past medical history    Assessments:  0950 I obtained history and examined the patient as noted above.   1102 I rechecked the patient and explained findings.     Consults:   1055 I consulted with Dr. Montague, cardiology, regarding the patient's history and presentation here in the emergency department.    1130 I consulted with MURRAY Alcantara for Dr. Timmons, on call Hospitalist, regarding the patient's history and presentation here in the emergency department.    Interventions:  1000 Aspirin 325 mg PO  1000 Oxycodone 5 mg PO  1112 Heparin 4,700 Units IV   1113 Heparin 950 Units/Hr IV     Disposition:  The patient was admitted to the hospital under the care of Dr. Timmons.     Impression & Plan     Medical Decision Making:  Sabino Jerry is a 61 year old male with history of Type II Diabetes, hypertension, and hyperlipidemia who presents with chest pain and shortness of breath.  Upon initial valuation he is hemodynamically stable with normal vital signs.  He is afebrile.  On physical exam he is well-appearing however complains of significant pain with deep breathing and movement.  He states that his pain is significantly worse with laying flat and improved when sitting forward.  EKG was obtained which did not show any acute ischemic changes however there were some subtle NH depressions concerning for possible pericarditis.   Clinically this would be consistent with his presentation.  Patient's troponin level also was significantly elevated therefore ACS was also strongly considered.  Patient was started on heparin.  Will be admitted for further evaluation of his NSTEMI, possible pericarditis.  Patient made hemodynamically stable under my care.    Covid-19  Sabino Jerry was evaluated during a global COVID-19 pandemic, which necessitated consideration that the patient might be at risk for infection with the SARS-CoV-2 virus that causes COVID-19.   Applicable protocols for evaluation were followed during the patient's care. COVID-19 was considered as part of the patient's evaluation. The plan for testing is: a test was obtained during this visit.    Diagnosis:    ICD-10-CM    1. NSTEMI (non-ST elevated myocardial infarction) (H)  I21.4 Case Request Cath Lab: Heart Catheterization with Possible Intervention     Case Request Cath Lab: Heart Catheterization with Possible Intervention     Cardiac Catheterization     Cardiac Catheterization   2. Acute pericarditis, unspecified type  I30.9      Scribe Disclosure:  Nilesh YEE, am serving as a scribe at 9:14 AM on 7/27/2021 to document services personally performed by Bakari Viera MD based on my observations and the provider's statements to me.            Bakari Viera MD  07/27/21 2037

## 2021-07-27 NOTE — CONSULTS
Care Management Follow Up    Length of Stay (days): 0    Expected Discharge Date: 07/28/2021     Concerns to be Addressed:  Financial concerns           Additional Information:  CM consulted for insurance/financial concerns. Per chart review, [place] and [place] concerns about insurance. RN CC/SW does not follow insurance concerns. Referral sent to Financial Counseling to follow him for his financial concerns and insurance needs. Will complete consult at this time. Please consult if CC needs arise.         Olivia Ojeda RN BSN CM  Inpatient Care Coordination  [facility]  [phone]

## 2021-07-27 NOTE — Clinical Note
The first balloon was inserted into the left anterior descending and distal left anterior descending.Max pressure = 14 santino. Total duration = 30 seconds.

## 2021-07-28 ENCOUNTER — APPOINTMENT (OUTPATIENT)
Dept: PHYSICAL THERAPY | Facility: CLINIC | Age: 61
DRG: 251 | End: 2021-07-28
Attending: INTERNAL MEDICINE

## 2021-07-28 VITALS
RESPIRATION RATE: 14 BRPM | DIASTOLIC BLOOD PRESSURE: 57 MMHG | HEART RATE: 86 BPM | HEIGHT: 66 IN | TEMPERATURE: 98.6 F | BODY MASS INDEX: 28.21 KG/M2 | SYSTOLIC BLOOD PRESSURE: 123 MMHG | WEIGHT: 175.5 LBS | OXYGEN SATURATION: 97 %

## 2021-07-28 LAB
ANION GAP SERPL CALCULATED.3IONS-SCNC: 3 MMOL/L (ref 3–14)
ATRIAL RATE - MUSE: 94 BPM
BUN SERPL-MCNC: 18 MG/DL (ref 7–30)
CALCIUM SERPL-MCNC: 9.2 MG/DL (ref 8.5–10.1)
CHLORIDE BLD-SCNC: 104 MMOL/L (ref 94–109)
CHOLEST SERPL-MCNC: 129 MG/DL
CO2 SERPL-SCNC: 25 MMOL/L (ref 20–32)
CREAT SERPL-MCNC: 1.27 MG/DL (ref 0.66–1.25)
DIASTOLIC BLOOD PRESSURE - MUSE: NORMAL MMHG
ERYTHROCYTE [DISTWIDTH] IN BLOOD BY AUTOMATED COUNT: 14.6 % (ref 10–15)
FASTING STATUS PATIENT QL REPORTED: NO
GFR SERPL CREATININE-BSD FRML MDRD: 60 ML/MIN/1.73M2
GLUCOSE BLD-MCNC: 118 MG/DL (ref 70–99)
GLUCOSE BLDC GLUCOMTR-MCNC: 119 MG/DL (ref 70–99)
GLUCOSE BLDC GLUCOMTR-MCNC: 144 MG/DL (ref 70–99)
GLUCOSE BLDC GLUCOMTR-MCNC: 153 MG/DL (ref 70–99)
GLUCOSE BLDC GLUCOMTR-MCNC: 179 MG/DL (ref 70–99)
HBA1C MFR BLD: 6 % (ref 0–5.6)
HCT VFR BLD AUTO: 33.9 % (ref 40–53)
HDLC SERPL-MCNC: 44 MG/DL
HGB BLD-MCNC: 10.7 G/DL (ref 13.3–17.7)
INTERPRETATION ECG - MUSE: NORMAL
LDLC SERPL CALC-MCNC: 62 MG/DL
MCH RBC QN AUTO: 27.4 PG (ref 26.5–33)
MCHC RBC AUTO-ENTMCNC: 31.6 G/DL (ref 31.5–36.5)
MCV RBC AUTO: 87 FL (ref 78–100)
NONHDLC SERPL-MCNC: 85 MG/DL
P AXIS - MUSE: 69 DEGREES
PLATELET # BLD AUTO: 346 10E3/UL (ref 150–450)
POTASSIUM BLD-SCNC: 4.2 MMOL/L (ref 3.4–5.3)
PR INTERVAL - MUSE: 160 MS
QRS DURATION - MUSE: 80 MS
QT - MUSE: 332 MS
QTC - MUSE: 415 MS
R AXIS - MUSE: 21 DEGREES
RBC # BLD AUTO: 3.9 10E6/UL (ref 4.4–5.9)
SODIUM SERPL-SCNC: 132 MMOL/L (ref 133–144)
SYSTOLIC BLOOD PRESSURE - MUSE: NORMAL MMHG
T AXIS - MUSE: 23 DEGREES
TRIGL SERPL-MCNC: 116 MG/DL
TROPONIN I SERPL-MCNC: 2.86 UG/L (ref 0–0.04)
TROPONIN I SERPL-MCNC: 3.56 UG/L (ref 0–0.04)
TROPONIN I SERPL-MCNC: 3.85 UG/L (ref 0–0.04)
UFH PPP CHRO-ACNC: 0.31 IU/ML
UFH PPP CHRO-ACNC: 0.36 IU/ML
VENTRICULAR RATE- MUSE: 94 BPM
WBC # BLD AUTO: 8.2 10E3/UL (ref 4–11)

## 2021-07-28 PROCEDURE — 250N000013 HC RX MED GY IP 250 OP 250 PS 637: Performed by: INTERNAL MEDICINE

## 2021-07-28 PROCEDURE — 82374 ASSAY BLOOD CARBON DIOXIDE: CPT | Performed by: PHYSICIAN ASSISTANT

## 2021-07-28 PROCEDURE — 85520 HEPARIN ASSAY: CPT | Performed by: INTERNAL MEDICINE

## 2021-07-28 PROCEDURE — 36415 COLL VENOUS BLD VENIPUNCTURE: CPT | Performed by: INTERNAL MEDICINE

## 2021-07-28 PROCEDURE — 99239 HOSP IP/OBS DSCHRG MGMT >30: CPT | Performed by: INTERNAL MEDICINE

## 2021-07-28 PROCEDURE — 93005 ELECTROCARDIOGRAM TRACING: CPT

## 2021-07-28 PROCEDURE — 84484 ASSAY OF TROPONIN QUANT: CPT | Performed by: INTERNAL MEDICINE

## 2021-07-28 PROCEDURE — 83036 HEMOGLOBIN GLYCOSYLATED A1C: CPT | Performed by: PHYSICIAN ASSISTANT

## 2021-07-28 PROCEDURE — 99232 SBSQ HOSP IP/OBS MODERATE 35: CPT | Performed by: INTERNAL MEDICINE

## 2021-07-28 PROCEDURE — 85027 COMPLETE CBC AUTOMATED: CPT | Performed by: INTERNAL MEDICINE

## 2021-07-28 PROCEDURE — 80061 LIPID PANEL: CPT | Performed by: PHYSICIAN ASSISTANT

## 2021-07-28 RX ORDER — NITROGLYCERIN 0.4 MG/1
TABLET SUBLINGUAL
Qty: 15 TABLET | Refills: 1 | Status: SHIPPED | OUTPATIENT
Start: 2021-07-28

## 2021-07-28 RX ORDER — ISOSORBIDE MONONITRATE 30 MG/1
30 TABLET, EXTENDED RELEASE ORAL DAILY
Qty: 15 TABLET | Refills: 1 | Status: SHIPPED | OUTPATIENT
Start: 2021-07-28 | End: 2021-07-31

## 2021-07-28 RX ORDER — CLOPIDOGREL BISULFATE 75 MG/1
75 TABLET ORAL DAILY
Qty: 30 TABLET | Refills: 0 | Status: SHIPPED | OUTPATIENT
Start: 2021-07-29 | End: 2021-08-03

## 2021-07-28 RX ADMIN — LISINOPRIL 2.5 MG: 2.5 TABLET ORAL at 09:54

## 2021-07-28 RX ADMIN — ASPIRIN 81 MG: 81 TABLET ORAL at 09:54

## 2021-07-28 RX ADMIN — METOPROLOL TARTRATE 12.5 MG: 25 TABLET, FILM COATED ORAL at 13:30

## 2021-07-28 RX ADMIN — ISOSORBIDE MONONITRATE 30 MG: 30 TABLET, EXTENDED RELEASE ORAL at 09:54

## 2021-07-28 RX ADMIN — CLOPIDOGREL BISULFATE 75 MG: 75 TABLET ORAL at 09:54

## 2021-07-28 RX ADMIN — METOPROLOL TARTRATE 25 MG: 25 TABLET, FILM COATED ORAL at 09:54

## 2021-07-28 ASSESSMENT — ACTIVITIES OF DAILY LIVING (ADL)
ADLS_ACUITY_SCORE: 17

## 2021-07-28 NOTE — DISCHARGE SUMMARY
[facility]  Discharge Summary  Hospitalist      Date of Admission:  7/27/2021  Date of Discharge:  7/28/2021  Provider:  April Henry MD  Date of Service (when I last saw the patient): 07/28/21      Primary Provider: No Ref-Primary, Physician          Discharge Diagnosis:   Discharge Diagnoses   Non-ST elevation MI  Acute renal failure  Anemia.   Hypertension  Diabetes mellitus    Other medical issues:  No past medical history on file.       History of Present Illness   Sabino Jerry is an 61 year old male who presented with chest pain.   please see the admission history and physical for full details.    Hospital Course     Sabino Jerry was admitted on 7/27/2021.  He is a 61 year old male with past medical history significant for hypertension, hyperlipidemia, diabetes mellitus type 2, NICK and obesity who was admitted for evaluation of right-sided chest pain.  Was found to have elevated troponin admitted for non-ST elevation MI.  Given multiple cardiac risk factors he underwent coronary angiogram 7/27 which showed multivessel coronary artery disease.  Subsequently post angiogram patient did develop EKG changes and chest pain.  Started on IV heparin.  This morning patient is pain-free troponin is trending down.  Discussed with cardiology will discontinue heparin and discharge patient upon his request.  He is visiting from [facility] and would like to return to his own cardiologist..  Per cardiology patient can be discharged if remains stable later today    The following problems were addressed during his hospitalization:    Non-ST elevation right-sided chest pain with possible pericarditis  -Status post coronary angiogram with multivessel disease noted on 7/27/2021  -CRP elevated to 25.5  -Troponin peaked at 3. 8 at night  -On IV heparin  -Imdur  -Beta-blocker  -Statins  -Aspirin  -Plavix  -Cardiology consulted and is following     NICK: creatinine of 1.35 on admission, unclear  baseline or if patient has underlying CKD with h/o HTN and DM  -Stable     HTN: BP intermittently elevated in the ED  -continue PTA Metoprolol and lisinopril     DM2: reportedly well controlled based on last HgbA1c in 02/2021 prior to traveling to [place] from [facility]  - HgbA1c 6.0  -hold Metformin for 72 hours after coronary angiogram  -sliding scale insulin with meals after procedure      Drop in hemoglobin  -Repeat hemoglobin is stable no signs of active bleeding       Significant Results and Procedures   As noted above coronary angiogram on 7/27/2021      Pending Results   Unresulted Labs Ordered in the Past 30 Days of this Admission     No orders found for last 31 day(s).          Code Status   Full Code       Primary Care Physician   Physician No Ref-Primary    Physical Exam   Temp: 98.5  F (36.9  C) Temp src: Oral BP: 109/62 Pulse: 83   Resp: 16 SpO2: 99 % O2 Device: None (Room air) Oxygen Delivery: 3 LPM  Vitals:    07/27/21 0908 07/27/21 1404   Weight: 78.6 kg (173 lb 4.5 oz) 79.6 kg (175 lb 8 oz)     Vital Signs with Ranges  Temp:  [97.8  F (36.6  C)-99.2  F (37.3  C)] 98.5  F (36.9  C)  Pulse:  [] 83  Resp:  [16-20] 16  BP: (106-157)/(52-87) 109/62  SpO2:  [93 %-99 %] 99 %  No intake/output data recorded.    Constitutional: Awake, alert, cooperative, no apparent distress, and appears stated age.  Respiratory: No increased work of breathing, good air exchange, clear to auscultation bilaterally, no crackles or wheezing.  Cardiovascular: Normal apical impulse,normal S1 and S2,   GI:  normal bowel sounds, soft, non-distended, non-tender      Discharge Disposition   Discharged to home    Consultations This Hospital Stay   PHARMACY IP CONSULT  PHARMACY IP CONSULT  CARDIOLOGY IP CONSULT  CARE MANAGEMENT / SOCIAL WORK IP CONSULT  PHARMACY IP CONSULT  PHARMACY IP CONSULT  NUTRITION SERVICES ADULT IP CONSULT  CARDIAC REHAB IP CONSULT  PHARMACY IP CONSULT  PHARMACY IP CONSULT  PHARMACY IP CONSULT  PHARMACY IP  "CONSULT  SMOKING CESSATION PROGRAM IP CONSULT    Time Spent on this Encounter   I, April Henry MD, personally saw the patient today and spent greater than 30 minutes discharging this patient.    Discharge Orders      CARDIAC REHAB REFERRAL      Reason for your hospital stay    Acute coronary syndrome non-ST elevation MI     Follow-up and recommended labs and tests     Follow up with primary care provider, Physician No Ref-Primary, within 7 days to follow up on results.  The following labs/tests are recommended: Basic metabolic panel, record of blood glucose, and hemoglobin  Primary care provider to optimize management  Follow-up with cardiology as soon as possible early next week  Hold Metformin for 72 hours after cardiac catheterization  Cardiac cath results\"  Left ventricular filling pressures are mildly elevated.    Mid RCA lesion is 100% stenosed.    Dist LM lesion is 20% stenosed.    Mid Cx lesion is 45% stenosed.    3rd Mrg lesion is 99% stenosed.    LPDA lesion is 55% stenosed.    Prox LAD lesion is 40% stenosed.    Dist LAD lesion is 100% stenosed.     1, Left dominant system. Normal LVEF per echo (subtle inferior septal hypokinesis to my reading). Mild elevated LVEDP  2. There is a  of a very small non-dom RCA, severe narrowing of a tiny OM3 and  of the apical Lad which wrapped around the apex and fed the distal 1/4-1/3 of the inferior septum.   Given ongoing chest pain and ECG changes inferiorly Dr Montague and I felt attempt to open Lad with the hope the vessel was larger than it appeared.  3. I crossed the apical LAD using  technic and micro-catheter (after discussion with de le[place] cardiologist Dr Montague). Eventually wired vessel and micro catheter. After ic NTG I did tip injections from the microcatheter which revealed the apical LAD is less than 1.5mm diameter and feeds distal inferior wall. The Lad lumen is now patent but the vessel opening matches the diameter of the microcatheter (<1mm). I did " "not pass a balloon down. It would have only been a 1.25 or 1.5mm balloon and unlikely to remain open.  The patients chest pain did resolved and there were less ST changes after this.  REC- add BB, imdur, plavix, ACE  Medical therapy is only option, diffuse small vessel/distal vessel disease is not amendable to PCI/CABG and although balloon PCI to Lad could be done there is little gain and little chance of long term patency\"    Continue prior to admission cholesterol medication blood pressure medication in addition take daily aspirin and also Plavix as prescribed.  Please follow-up with your cardiologist to optimize your medication and primary care provider to review and follow-up on meds.    Please call or come immediately if you have any chest pain pressure heaviness or tightness.    Follow-up plans as recommended by cardiologist.     Activity    Your activity upon discharge: activity as tolerated     Diet    Follow this diet upon discharge: Orders Placed This Encounter      Low Saturated Fat Na <2400 mg and diabetic diet     Discharge Medications   Current Discharge Medication List      START taking these medications    Details   aspirin (ASA) 81 MG EC tablet Take 1 tablet (81 mg) by mouth daily  Qty: 30 tablet, Refills: 0    Associated Diagnoses: NSTEMI (non-ST elevated myocardial infarction) (H)      clopidogrel (PLAVIX) 75 MG tablet Take 1 tablet (75 mg) by mouth daily  Qty: 30 tablet, Refills: 0    Associated Diagnoses: NSTEMI (non-ST elevated myocardial infarction) (H)      HOLD MEDICATION IN ORDER SET 1 each daily    Associated Diagnoses: NSTEMI (non-ST elevated myocardial infarction) (H)         CONTINUE these medications which have CHANGED    Details   metFORMIN (GLUCOPHAGE) 850 MG tablet Take 1 tablet (850 mg) by mouth daily (with dinner)    Comments: Hold Metformin for 72 hours after coronary angiogram on 7/27/2021  Associated Diagnoses: Type 2 diabetes mellitus with hyperosmolarity without coma, " unspecified whether long term insulin use (H)         CONTINUE these medications which have NOT CHANGED    Details   cyclobenzaprine (FLEXERIL) 5 MG tablet Take 1 tablet (5 mg) by mouth at bedtime as needed, may repeat once for muscle spasms  Qty: 20 tablet, Refills: 0    Associated Diagnoses: Right shoulder pain, unspecified chronicity; Rib pain      diclofenac (VOLTAREN) 1 % topical gel Apply 2 g topically 4 times daily  Qty: 100 g, Refills: 0    Associated Diagnoses: Right shoulder pain, unspecified chronicity      !! NONFORMULARY Take 1 tablet by mouth daily Pt's combo pill from [place]: atorvastatin 10 mg + fenofibrate 160 mg      !! NONFORMULARY Take 1 tablet by mouth daily Pt's combo pill from [place]: methylcobolamin 1500 mcg + alpha lipoic acid 100 mg + pyridoxine 3 mg + folic acid 1.5 mg      !! NONFORMULARY Take 1 tablet by mouth daily Pt's combo pill from [place]: telmisartan 40 mg + metoprolol succinate 47.5 mg       !! - Potential duplicate medications found. Please discuss with provider.        Allergies   Allergies   Allergen Reactions     Penicillins      Data   Most Recent 3 CBC's:Recent Labs   Lab Test 07/28/21  1238 07/27/21  2208 07/27/21  1430   WBC 8.2 10.9 16.0*   HGB 10.7* 10.9* 12.9*   MCV 87 89 88    370 377      Most Recent 3 BMP's:  Recent Labs   Lab Test 07/28/21  1201 07/28/21  0813 07/28/21  0622 07/27/21  0933   NA  --   --  132* 135   POTASSIUM  --   --  4.2 4.4   CHLORIDE  --   --  104 105   CO2  --   --  25 24   BUN  --   --  18 24   CR  --   --  1.27* 1.35*   ANIONGAP  --   --  3 6   ALEC  --   --  9.2 9.6   * 119* 118* 121*     Most Recent 2 LFT's:No lab results found.  Most Recent INR's and Anticoagulation Dosing History:  Anticoagulation Dose History    There is no flowsheet data to display.       Most Recent 3 Troponin's:  Recent Labs   Lab Test 07/28/21  1238 07/28/21  0622 07/28/21  0142   TROPONIN 2.860* 3.555* 3.849*     Most Recent Cholesterol Panel:  Recent Labs    Lab Test 21  0622   CHOL 129   LDL 62   HDL 44   TRIG 116     Most Recent 6 Bacteria Isolates From Any Culture (See EPIC Reports for Culture Details):No lab results found.  Most Recent TSH, T4 and A1c Labs:  Recent Labs   Lab Test 21   A1C 6.0*     Results for orders placed or performed during the hospital encounter of 21   Chest XR,  PA & LAT    Narrative    CHEST TWO VIEWS  2021 10:56 AM     HISTORY: Chest pain.    COMPARISON: 2021.      Impression    IMPRESSION: Mild opacity at the left lung base medially in the  retrocardiac region could be related to atelectasis and/or pneumonia.  Mild scarring and/or linear atelectasis in the right lower lung is  unchanged. The lungs are otherwise clear. Heart size and pulmonary  vascularity are within normal limits. Aortic calcification. No pleural  effusions.    KONRAD MEDINA MD         SYSTEM ID:  [id number]   Echocardiogram Limited     Value    LVEF  60-65%    Narrative    [id number]  [id number]  [id number]  [id number]^DUKE^LAYA     [facility]  Echocardiography Laboratory  [address] [place] [address]     Name: SHIRIN RODRIGUEZ  MRN: [id number]  : 1960  Study Date: 2021 12:00 PM  Age: 61 yrs  Gender: Male  Patient Location: [facility]  Reason For Study: Chest Pain  Ordering Physician: DUKE ROBLES  Referring Physician: Yvonne Ref-Primary, Physician  Performed By: Jessica Gaston     BSA: 1.9 m2  Height: 66 in  Weight: 173 lb  HR: 77  BP: 129/69 mmHg  ______________________________________________________________________________  Procedure  Limited Portable Echo Adult. (Emergent exam, abbreviated study performed).  Optison (NDC #7006-4651) given intravenously.  ______________________________________________________________________________  Interpretation Summary     The visual ejection fraction is 60-65%.  Left ventricular systolic function is normal.  No regional wall  motion abnormalities noted.  ______________________________________________________________________________  Left Ventricle  The left ventricle is normal in size. The visual ejection fraction is 60-65%.  Left ventricular systolic function is normal. No regional wall motion  abnormalities noted.     Right Ventricle  The right ventricle is normal in size and function.     Atria  Normal left atrial size. Right atrial size is normal.     Mitral Valve  There is mild mitral annular calcification. There is trace mitral  regurgitation.     Tricuspid Valve  There is trace tricuspid regurgitation. The right ventricular systolic  pressure is approximated at 27.0 mmHg plus the right atrial pressure.     Aortic Valve  The aortic valve is trileaflet. There is trivial trileaflet aortic sclerosis.  No aortic regurgitation is present. No hemodynamically significant valvular  aortic stenosis.     Pulmonic Valve  There is trace pulmonic valvular regurgitation.     Vessels  IVC diameter <2.1 cm collapsing >50% with sniff suggests a normal RA pressure  of 3 mmHg.     Pericardium  There is no pericardial effusion.     Rhythm  Sinus rhythm was noted.  ______________________________________________________________________________  Doppler Measurements & Calculations  MV E max sang: 86.9 cm/sec  MV A max sang: 106.5 cm/sec  MV E/A: 0.82  MV dec time: 0.14 sec  Ao V2 max: 163.9 cm/sec  Ao max PG: 10.7 mmHg  TR max sang: 259.9 cm/sec  TR max P.0 mmHg  E/E' av.4  Lateral E/e': 9.4  Medial E/e': 13.3     ______________________________________________________________________________  Report approved by: Jose Linares 2021 12:30 PM         Cardiac Catheterization    Narrative      Left ventricular filling pressures are mildly elevated.    Mid RCA lesion is 100% stenosed.    Dist LM lesion is 20% stenosed.    Mid Cx lesion is 45% stenosed.    3rd Mrg lesion is 99% stenosed.    LPDA lesion is 55% stenosed.    Prox LAD lesion  is 40% stenosed.    Dist LAD lesion is 100% stenosed.     1, Left dominant system. Normal LVEF per echo (subtle inferior septal   hypokinesis to my reading). Mild elevated LVEDP  2. There is a  of a very small non-dom RCA, severe narrowing of a tiny   OM3 and  of the apical Lad which wrapped around the apex and fed the   distal 1/4-1/3 of the inferior septum.   Given ongoing chest pain and ECG   changes inferiorly Dr Montague and I felt attempt to open Lad with the hope the   vessel was larger than it appeared.  3. I crossed the apical LAD using  technic and micro-catheter (after   discussion with de le[place] cardiologist Dr Montague). Eventually wired vessel and   micro catheter. After ic NTG I did tip injections from the microcatheter   which revealed the apical LAD is less than 1.5mm diameter and feeds distal   inferior wall. The Lad lumen is now patent but the vessel opening matches   the diameter of the microcatheter (<1mm). I did not pass a balloon down.   It would have only been a 1.25 or 1.5mm balloon and unlikely to remain   open.  The patients chest pain did resolved and there were less ST changes   after this.  REC- add BB, imdur, plavix, ACE  Medical therapy is only option, diffuse   small vessel/distal vessel disease is not amendable to PCI/CABG and   although balloon PCI to Lad could be done there is little gain and little   chance of long term patency               Disclaimer: This note consists of symbols derived from keyboarding, dictation and/or voice recognition software. As a result, there may be errors in the script that have gone undetected. Please consider this when interpreting information found in this chart.

## 2021-07-28 NOTE — PROVIDER NOTIFICATION
2111  Provider Notified: Hospitalist  Reason: pt having chest pain, EKG shows St elevation STEMI. Please advise  Order:

## 2021-07-28 NOTE — PLAN OF CARE
Patient visiting from [place], admitted with chest pain. Angio, rt groin. No intervention. Denies any further pain. Continue POC.

## 2021-07-28 NOTE — PROVIDER NOTIFICATION
2126  Provider Notified: Hospitalist  Reason: repage: pt having chest pain, EKG shows St elevation STEMI. Please advise  Order:

## 2021-07-28 NOTE — PROGRESS NOTES
07/28/21 1113   Quick Adds   Type of Visit Initial PT Evaluation   Living Environment   Living Environment Comments Pt lives with son/dgt currently. ~ 6 stairs to manage. Ind with mobility at baseline.    Self-Care   Usual Activity Tolerance good   Current Activity Tolerance moderate   Regular Exercise Yes   Activity/Exercise Type walking   Equipment Currently Used at Home none   Disability/Function   Fall history within last six months no   General Information   Onset of Illness/Injury or Date of Surgery 07/27/21   Referring Physician Joshua Mejia MD   Patient/Family Therapy Goals Statement (PT) To recover and improve activity tolerance   Pertinent History of Current Problem (include personal factors and/or comorbidities that impact the POC) Pt is a 61 year old Tamil speaking male visiting family from [place] with PMH significant for DM2, HTN, and HLD who presents for evaluation of right sided chest pain, NSTEMI. S/p coronary angiogram, no stent placement.   Existing Precautions/Restrictions cardiac   Cognition   Orientation Status (Cognition) unable/difficult to assess;oriented x 3  (d/t VAISHNAVI interpreting)   Affect/Mental Status (Cognition) WFL   Follows Commands (Cognition) WFL   Pain Assessment   Patient Currently in Pain No   Range of Motion (ROM)   ROM Comment B LE WFL   Strength   Strength Comments able to complete B SLR; functionally demonstrated >3/5 strength   Bed Mobility   Comment (Bed Mobility) ind supine <> sit   Transfers   Transfer Safety Comments ind sit <> stand   Gait/Stairs (Locomotion)   Distance in Feet (Required for LE Total Joints) 12   Pattern (Gait) step-through   Comment (Gait/Stairs) Ind without AD   Balance   Balance Comments normal sitting balance, good standing w/o AD   Clinical Impression   Criteria for Skilled Therapeutic Intervention yes, treatment indicated   PT Diagnosis (PT) impaired functional mobility   Influenced by the following impairments decreased cardiovascular  "endurance   Functional limitations due to impairments impaired ambulation, stairs   Clinical Presentation Evolving/Changing   Clinical Presentation Rationale Recent MI; good functional strength   Clinical Decision Making (Complexity) low complexity   Therapy Frequency (PT) Daily   Predicted Duration of Therapy Intervention (days/wks) 2 days   Planned Therapy Interventions (PT) home exercise program;patient/family education;transfer training;progressive activity/exercise;risk factor education;home program guidelines   Risk & Benefits of therapy have been explained evaluation/treatment results reviewed;care plan/treatment goals reviewed;risks/benefits reviewed;current/potential barriers reviewed;participants voiced agreement with care plan;participants included;patient;daughter;son   PT Discharge Planning    PT Discharge Recommendation (DC Rec) home with outpatient cardiac rehab   PT Rationale for DC Rec Patient with stable cardiac response during assessment, did report some chest \"pinching\" at end of ambulation. Rec f/u with OP CR if cardiology recs for continued monitoring activity progression.    Total Evaluation Time   Total Evaluation Time (Minutes) 8     "

## 2021-07-28 NOTE — PLAN OF CARE
For VS and complete assessments, please see documentation flowsheets.     Pertinent shift assessments: VSS. A&Ox4. LS clear. Tele SR with ST elevation. Up with SBA in the room, declines help from staff, brother-in-law staying over night to assist and translate for the patient.     2058 Patient complained of chest pain  with activity. Nitroglycerin SL tab given, see MAR. Hospitalist paged. EKG done, see results. Dr Rojo responded to page. Per order, Heparin gtt started, infusing at 950 units/hr. Patient given PRN oxycodone for medialsternal chest pain, see MAR. Troponin draw at 2150 was 3.442, redraw every 4 hr.     0200 Patient states chest pain is the same but declines any pain medication at this time.     0347: HepXa came back, hep gtt will remain the same according to orders, at 950 units/hr.     Treatment Plan: Continue treatment plan.

## 2021-07-28 NOTE — PHARMACY-ADMISSION MEDICATION HISTORY
"Admission medication history interview status for this patient is complete. See [facility] admission navigator for allergy information, prior to admission medications and immunization status.     Medication history interview done, indicate source(s): Son-in-law  Medication history resources (including written lists, pill bottles, clinic record):None  Pharmacy: Unknown where patient would like to  prescriptions upon discharge.  Please verify with patient prior to discharge and add the Pharmacy into the Admission navigator, under the \"Prior to Adm Meds\" section.       Changes made to PTA medication list:  Added: all meds    Actions taken by pharmacist (provider contacted, etc): left sticky note for MD     Additional medication history information:  - Patient brought his medications from [place] some of which are combo pills.    Medication reconciliation/reorder completed by provider prior to medication history?  N    Prior to Admission medications    Medication Sig Last Dose Taking? Auth Provider   cyclobenzaprine (FLEXERIL) 5 MG tablet Take 1 tablet (5 mg) by mouth at bedtime as needed, may repeat once for muscle spasms  at PRN Yes Gwen Gee MD   diclofenac (VOLTAREN) 1 % topical gel Apply 2 g topically 4 times daily 7/26/2021 at Unknown time Yes Gwen Gee MD   metFORMIN (GLUCOPHAGE) 850 MG tablet Take 850 mg by mouth daily (with dinner)  at From [place] Yes Reported, Patient   NONFORMULARY Take 1 tablet by mouth daily Pt's combo pill from [place]: atorvastatin 10 mg + fenofibrate 160 mg Past Week at Unknown time Yes Unknown, Entered By History   NONFORMULARY Take 1 tablet by mouth daily Pt's combo pill from [place]: methylcobolamin 1500 mcg + alpha lipoic acid 100 mg + pyridoxine 3 mg + folic acid 1.5 mg Past Week at Unknown time Yes Unknown, Entered By History   NONFORMULARY Take 1 tablet by mouth daily Pt's combo pill from [place]: telmisartan 40 mg + metoprolol succinate 47.5 mg Past Week at Unknown time " Yes Unknown, Entered By History

## 2021-07-28 NOTE — PLAN OF CARE
Lab notified: hep Xa and troponin due at 10am, Not yet drawn at this time.  1210:  Called a second time and changed order to STAT.

## 2021-07-28 NOTE — PROGRESS NOTES
"Cross cover.  Notified patient developed chest pain, noted he is status post coronary angiogram done this afternoon.  Please review the results, no stent placed, has multivessel coronary artery disease. Patient seen and examined at bedside.  His son also available and help it with translation.  Patient is from [place] and visiting his family here. eport.   EKG done reported as \"ST elevation MI\" on inferior leads, complaining of reproducible epigastric pain .no nausea, no diaphoresis, no dizziness.  Troponin x1 ordered and pending  Given the EKG change I paged on-call cardiologist Dr. Olivo, and discussed with her.  She reviewed the angio report and recommended to treat him as non-ST elevation MI, started on heparin drip (6 hours after angiogram) will be stress test at 11 PM.  Continue to monitor on telemetry.  Patient is on Imdur 30 mg p.o. daily, started today .  Consider titrating up as patient's blood pressure tolerates .  Currently is hemodynamically stable and symptom-free.  -Serial troponin ordered.  -If troponin is markedly elevated, the cardiologist requested to be paged.  I discussed with patient and his son at bedside.      "

## 2021-07-28 NOTE — PROGRESS NOTES
[facility]    Hospitalist Progress Note  Name: Sabino Jerry    MRN: [id number]  Provider: April Henry MD  Date of Service: 07/28/2021    Assessment & Plan   Summary of Stay: Sabino Jerry is a 61 year old male with past medical history significant for hypertension, hyperlipidemia, diabetes mellitus type 2, NICK and obesity who was admitted on 7/27/2021 for evaluation of right-sided chest pain.  Was found to have elevated troponin admitted for non-ST elevation MI.  Given multiple cardiac risk factors he underwent coronary angiogram 7/27 which showed multivessel coronary artery disease.  Subsequently post angiogram patient did develop EKG changes and chest pain.  Started on IV heparin.  This morning patient is pain-free troponin is trending down.  Discussed with cardiology will discontinue heparin and discharge patient if remains stable later today    Non-ST elevation right-sided chest pain with possible pericarditis  -Status post coronary angiogram with multivessel disease noted on 7/27/2021  -CRP elevated to 25.5  -Troponin peaked at 3. 8 at night  -On IV heparin  -Imdur  -Beta-blocker  -Statins  -Aspirin  -Plavix  -Cardiology consulted and is following    NICK: creatinine of 1.35 on admission, unclear baseline or if patient has underlying CKD with h/o HTN and DM  -gentle IVF hydration  -recheck BMP in AM     HTN: BP intermittently elevated in the ED  -continue PTA Metoprolol and lisinopril     DM2: reportedly well controlled based on last HgbA1c in 02/2021 prior to traveling to [place] from [facility]  - HgbA1c 6.0  -hold Metformin  -sliding scale insulin with meals after procedure      Drop in hemoglobin  -Repeat hemoglobin is stable no signs of active bleeding      DVT Prophylaxis: IV heparin  Code Status: Full Code    Disposition: Expected discharge today if remains stable      Interval History   Assumed care reviewed chart, patient seen with the help of his son who was interpreting for  patient patient speaks tamil.  Denies chest pain shortness of breath lightheadedness or dizziness no pain pressure heaviness.    -Data reviewed today: I reviewed all new labs and imaging reports over the last 24 hours. I personally reviewed the EKG tracing showing No new changes ST-T changes as noted before.    Physical Exam   Temp: 97.8  F (36.6  C) Temp src: Oral BP: 112/54 Pulse: 85   Resp: 18 SpO2: 98 % O2 Device: None (Room air) Oxygen Delivery: 3 LPM  Vitals:    07/27/21 0908 07/27/21 1404   Weight: 78.6 kg (173 lb 4.5 oz) 79.6 kg (175 lb 8 oz)     Vital Signs with Ranges  Temp:  [97.8  F (36.6  C)-99.2  F (37.3  C)] 97.8  F (36.6  C)  Pulse:  [] 85  Resp:  [16-20] 18  BP: (112-160)/(54-91) 112/54  SpO2:  [93 %-100 %] 98 %  No intake/output data recorded.      GEN:  Alert, oriented x 3, appears comfortable, NAD.  HEENT:  Normocephalic/atraumatic, no scleral icterus, no nasal discharge, mouth moist.  CV:  Regular rate and rhythm, no murmur or JVD.  S1 + S2 noted, no S3 or S4.  LUNGS:  Clear to auscultation bilaterally without rales/rhonchi/wheezing/retractions.  Symmetric chest rise on inhalation noted.  ABD:  Active bowel sounds, soft, non-tender/non-distended.  No rebound/guarding/rigidity.  EXT:  No edema.  No cyanosis.  No joint synovitis noted.  SKIN:  Dry to touch, no exanthems noted in the visualized areas.    Medications     Continuing ACE inhibitor/ARB/ARNI from home medication list OR ACE inhibitor/ARB order already placed during this visit       - MEDICATION INSTRUCTIONS -       - MEDICATION INSTRUCTIONS -       - MEDICATION INSTRUCTIONS -       heparin 950 Units/hr (07/27/21 2301)     - MEDICATION INSTRUCTIONS -       Percutaneous Coronary Intervention orders placed (this is information for BPA alerting)         aspirin  81 mg Oral Daily     clopidogrel  75 mg Oral Daily     insulin aspart  1-7 Units Subcutaneous TID AC     insulin aspart  1-5 Units Subcutaneous At Bedtime     isosorbide  mononitrate  30 mg Oral Daily     lisinopril  2.5 mg Oral Daily     metoprolol tartrate  25 mg Oral BID     rosuvastatin  40 mg Oral At Bedtime     sodium chloride (PF)  3 mL Intracatheter Q8H     Data     No results for input(s): PH, PHV, PO2, PO2V, SAT, PCO2, PCO2V, HCO3, HCO3V in the last 168 hours.  Recent Labs   Lab 07/27/21 2208 07/27/21 1430 07/27/21  0933   WBC 10.9 16.0* 10.0   HGB 10.9* 12.9* 12.3*   HCT 34.7* 40.8 38.9*   MCV 89 88 88    377 391     Recent Labs   Lab 07/28/21  0813 07/28/21 0622 07/28/21  0156 07/27/21  0933   NA  --  132*  --  135   POTASSIUM  --  4.2  --  4.4   CHLORIDE  --  104  --  105   CO2  --  25  --  24   ANIONGAP  --  3  --  6   * 118* 144* 121*   BUN  --  18  --  24   CR  --  1.27*  --  1.35*   GFRESTIMATED  --  60*  --  56*   ALEC  --  9.2  --  9.6     No results for input(s): CULT in the last 168 hours.  No results for input(s): NTBNPI, NTBNP in the last 168 hours.  No results for input(s): CKT in the last 168 hours.    Invalid input(s): CK, CK TOTAL  Recent Labs   Lab 07/27/21  0933   DD 0.32     No results for input(s): AST, ALT, GGT, ALKPHOS, BILITOTAL, BILICONJ, BILIDIRECT, MARÍA in the last 168 hours.    Invalid input(s): BILIRUBININDIRECT  No results for input(s): INR in the last 168 hours.  No results for input(s): LACT in the last 168 hours.  No results for input(s): LIPASE in the last 168 hours.  Recent Labs   Lab 07/28/21 0622 07/27/21  1746   CHOL 129 148   HDL 44 38*   LDL 62 89   TRIG 116 105     Recent Labs   Lab 07/27/21 2208 07/27/21  1430 07/27/21  0933    377 391     Recent Labs   Lab 07/28/21  0622 07/27/21  0933   BUN 18 24   CR 1.27* 1.35*     No results for input(s): TSH in the last 168 hours.  Recent Labs   Lab 07/28/21  0622 07/28/21  0142 07/27/21  2150   TROPONIN 3.555* 3.849* 3.442*     No results for input(s): COLOR, APPEARANCE, URINEGLC, URINEBILI, URINEKETONE, SG, UBLD, URINEPH, PROTEIN, UROBILINOGEN, NITRITE, LEUKEST,  RBCU, WBCU in the last 168 hours.    Recent Results (from the past 24 hour(s))   Chest XR,  PA & LAT    Narrative    CHEST TWO VIEWS  2021 10:56 AM     HISTORY: Chest pain.    COMPARISON: 2021.      Impression    IMPRESSION: Mild opacity at the left lung base medially in the  retrocardiac region could be related to atelectasis and/or pneumonia.  Mild scarring and/or linear atelectasis in the right lower lung is  unchanged. The lungs are otherwise clear. Heart size and pulmonary  vascularity are within normal limits. Aortic calcification. No pleural  effusions.    KONRAD MEDINA MD         SYSTEM ID:  [id number]   Echocardiogram Limited   Result Value    LVEF  60-65%    Narrative    [id number]  [id number]  [id number]  [id number]^TRAVIS^DUKE^LAYA     [facility]  Echocardiography Laboratory  [address] [place] [address]     Name: SHIRIN RODRIGUEZ  MRN: [id number]  : 1960  Study Date: 2021 12:00 PM  Age: 61 yrs  Gender: Male  Patient Location: [facility]  Reason For Study: Chest Pain  Ordering Physician: DUKE ROBLES  Referring Physician: Yvonne Ref-Primary, Physician  Performed By: Jessica Gaston     BSA: 1.9 m2  Height: 66 in  Weight: 173 lb  HR: 77  BP: 129/69 mmHg  ______________________________________________________________________________  Procedure  Limited Portable Echo Adult. (Emergent exam, abbreviated study performed).  Optison (NDC #[id number]) given intravenously.  ______________________________________________________________________________  Interpretation Summary     The visual ejection fraction is 60-65%.  Left ventricular systolic function is normal.  No regional wall motion abnormalities noted.  ______________________________________________________________________________  Left Ventricle  The left ventricle is normal in size. The visual ejection fraction is 60-65%.  Left ventricular systolic function is normal. No regional wall  motion  abnormalities noted.     Right Ventricle  The right ventricle is normal in size and function.     Atria  Normal left atrial size. Right atrial size is normal.     Mitral Valve  There is mild mitral annular calcification. There is trace mitral  regurgitation.     Tricuspid Valve  There is trace tricuspid regurgitation. The right ventricular systolic  pressure is approximated at 27.0 mmHg plus the right atrial pressure.     Aortic Valve  The aortic valve is trileaflet. There is trivial trileaflet aortic sclerosis.  No aortic regurgitation is present. No hemodynamically significant valvular  aortic stenosis.     Pulmonic Valve  There is trace pulmonic valvular regurgitation.     Vessels  IVC diameter <2.1 cm collapsing >50% with sniff suggests a normal RA pressure  of 3 mmHg.     Pericardium  There is no pericardial effusion.     Rhythm  Sinus rhythm was noted.  ______________________________________________________________________________  Doppler Measurements & Calculations  MV E max sang: 86.9 cm/sec  MV A max sang: 106.5 cm/sec  MV E/A: 0.82  MV dec time: 0.14 sec  Ao V2 max: 163.9 cm/sec  Ao max PG: 10.7 mmHg  TR max sang: 259.9 cm/sec  TR max P.0 mmHg  E/E' av.4  Lateral E/e': 9.4  Medial E/e': 13.3     ______________________________________________________________________________  Report approved by: Jose Linares 2021 12:30 PM         Cardiac Catheterization    Narrative      Left ventricular filling pressures are mildly elevated.    Mid RCA lesion is 100% stenosed.    Dist LM lesion is 20% stenosed.    Mid Cx lesion is 45% stenosed.    3rd Mrg lesion is 99% stenosed.    LPDA lesion is 55% stenosed.    Prox LAD lesion is 40% stenosed.    Dist LAD lesion is 100% stenosed.     1, Left dominant system. Normal LVEF per echo (subtle inferior septal   hypokinesis to my reading). Mild elevated LVEDP  2. There is a  of a very small non-dom RCA, severe narrowing of a tiny   OM3 and   of the apical Lad which wrapped around the apex and fed the   distal 1/4-1/3 of the inferior septum.   Given ongoing chest pain and ECG   changes inferiorly Dr Montague and I felt attempt to open Lad with the hope the   vessel was larger than it appeared.  3. I crossed the apical LAD using  technic and micro-catheter (after   discussion with [place] cardiologist Dr Montague). Eventually wired vessel and   micro catheter. After ic NTG I did tip injections from the microcatheter   which revealed the apical LAD is less than 1.5mm diameter and feeds distal   inferior wall. The Lad lumen is now patent but the vessel opening matches   the diameter of the microcatheter (<1mm). I did not pass a balloon down.   It would have only been a 1.25 or 1.5mm balloon and unlikely to remain   open.  The patients chest pain did resolved and there were less ST changes   after this.  REC- add BB, imdur, plavix, ACE  Medical therapy is only option, diffuse   small vessel/distal vessel disease is not amendable to PCI/CABG and   although balloon PCI to Lad could be done there is little gain and little   chance of long term patency

## 2021-07-28 NOTE — PLAN OF CARE
Patient discharging with family. All belongings in possession. All questions answered. Medication also sent to [facility] in [place]. Patient aware needs to be picked up. Discussed, in depth, discharge and plans. Patient and family agree with plan. Patient discharged to private car.

## 2021-07-28 NOTE — PROGRESS NOTES
Inpatient Cardiology Consultation Progress Note:    Sabino Jerry MRN#: [id number]   YOB: 1960 Age: 61 year old     Date of Admission: 7/27/2021  Consult indication: NSTEMI         Assessment and Plan:     # NSTEMI s/p coronary angiogram 7/27/2021 demonstrating a small non-dominant RCA mid 100% . There was also severe narrowing of a small OM3, as well as a  of the apical LAD which wrapped around the apex and fed the distal 1/4-1/3 of the inferior septum. Apical LAD  was crossed using  technique and was opened, however due to the small size of the vessel, a balloon was not passed. Medical therapy was determined to be the only option.     # HTN. BP elevated.  # HL  # DM2  # NICK  # Obesity    TTE 7/27/2021 LVEF 60-65% with normal wall motion, normal RV function, no significant valvular abnormalities.    - one episode of mild chest pain overnight, resolved with SL NTG, ECG unchanged from before, troponin down-trending, chest pain free since then  - continue aspirin, clopidogrel, rosuvastatin, metoprolol, imdur  - will discontinue lisinopril to allow for up-titration of anti-anginal medications  - Pt is planning to return to [facility] on Sunday. This is of course not ideal given his recent MI, however his cardiac catheterization demonstrated chronic occlusions that were not amenable to stenting, and his TTE demonstrated preserved LV function. In consideration of this, I feel it is reasonable for him to return home as planned, as long as he has close follow up with his cardiology team there, will send records/cath films     Thank you for allowing our team to participate in the care of Sabino Jerry.  Please do not hesitate to page me with any questions or concerns.     Kris Montague MD, [facility]  Cardiology  Text Page   July 28, 2021         Interval Events:     - mild chest pain overnight, resolved with SL NTG, none since then  - troponin down trending  - serial ECGs  stable  - no VT/NSVT on telemetry   - RFA groin site C/D/I         Medications reviewed:   Prior to admission medications:  Prior to Admission medications    Medication Sig Start Date End Date Taking? Authorizing Provider   cyclobenzaprine (FLEXERIL) 5 MG tablet Take 1 tablet (5 mg) by mouth at bedtime as needed, may repeat once for muscle spasms 7/24/21  Yes Gwen Gee MD   diclofenac (VOLTAREN) 1 % topical gel Apply 2 g topically 4 times daily 7/24/21  Yes Gwen Gee MD   metFORMIN (GLUCOPHAGE) 850 MG tablet Take 850 mg by mouth daily (with dinner)   Yes Reported, Patient   NONFORMULARY Take 1 tablet by mouth daily Pt's combo pill from [facility]: atorvastatin 10 mg + fenofibrate 160 mg   Yes Unknown, Entered By History   NONFORMULARY Take 1 tablet by mouth daily Pt's combo pill from [facility]: methylcobolamin 1500 mcg + alpha lipoic acid 100 mg + pyridoxine 3 mg + folic acid 1.5 mg   Yes Unknown, Entered By History   NONFORMULARY Take 1 tablet by mouth daily Pt's combo pill from [facility]: telmisartan 40 mg + metoprolol succinate 47.5 mg   Yes Unknown, Entered By History        Current medications:  Current Facility-Administered Medications Ordered in Epic   Medication Dose Route Frequency Last Rate Last Admin     acetaminophen (TYLENOL) tablet 650 mg  650 mg Oral Q6H PRN        Or     acetaminophen (TYLENOL) Suppository 650 mg  650 mg Rectal Q6H PRN         aspirin EC tablet 81 mg  81 mg Oral Daily   81 mg at 07/28/21 0954     clopidogrel (PLAVIX) tablet 75 mg  75 mg Oral Daily   75 mg at 07/28/21 0954     Continuing ACE inhibitor/ARB/ARNI from home medication list OR ACE inhibitor/ARB order already placed during this visit   Does not apply DOES NOT GO TO MAR         Continuing antiplatelet from home medication list OR antiplatelet order already placed during this visit   Does not apply DOES NOT GO TO MAR         Continuing statin from home medication list OR statin order already placed during this visit   Does  not apply DOES NOT GO TO MAR         Continuing statin from home medication list OR statin order already placed during this visit   Does not apply DOES NOT GO TO MAR         glucose gel 15-30 g  15-30 g Oral Q15 Min PRN        Or     dextrose 50 % injection 25-50 mL  25-50 mL Intravenous Q15 Min PRN        Or     glucagon injection 1 mg  1 mg Subcutaneous Q15 Min PRN         fentaNYL (PF) (SUBLIMAZE) injection 25 mcg  25 mcg Intravenous Q15 Min PRN         heparin 25,000 units in 0.45% NaCl 250 mL ANTICOAGULANT infusion  0-5,000 Units/hr Intravenous Continuous 9.5 mL/hr at 07/27/21 2301 950 Units/hr at 07/27/21 2301     HOLD: Metformin and metformin containing medications on day of procedure and 48 hours after IV contrast given for patients with acute kidney injury or severe chronic kidney disease (stage IV or stage V; i.e., eGFR less than 30)   Does not apply HOLD         HOLD: nitroGLYcerin IF   Does not apply HOLD         hydrALAZINE (APRESOLINE) injection 10 mg  10 mg Intravenous Q4H PRN         insulin aspart (NovoLOG) injection (RAPID ACTING)  1-7 Units Subcutaneous TID AC         insulin aspart (NovoLOG) injection (RAPID ACTING)  1-5 Units Subcutaneous At Bedtime         isosorbide mononitrate (IMDUR) 24 hr tablet 30 mg  30 mg Oral Daily   30 mg at 07/28/21 0954     lidocaine (LMX4) cream   Topical Q1H PRN         lidocaine 1 % 0.1-1 mL  0.1-1 mL Other Q1H PRN         lisinopril (ZESTRIL) tablet 2.5 mg  2.5 mg Oral Daily   2.5 mg at 07/28/21 0954     melatonin tablet 1 mg  1 mg Oral At Bedtime PRN         metoprolol (LOPRESSOR) injection 5-10 mg  5-10 mg Intravenous Q15 Min PRN         metoprolol tartrate (LOPRESSOR) tablet 25 mg  25 mg Oral BID   25 mg at 07/28/21 0954     midazolam (VERSED) injection 0.5 mg  0.5 mg Intravenous Q5 Min PRN         morphine (PF) injection 1 mg  1 mg Intravenous Q2H PRN         nitroGLYcerin (NITROSTAT) sublingual tablet 0.4 mg  0.4 mg Sublingual Q5 Min PRN   0.4 mg at 07/27/21  2100     ondansetron (ZOFRAN-ODT) ODT tab 4 mg  4 mg Oral Q6H PRN        Or     ondansetron (ZOFRAN) injection 4 mg  4 mg Intravenous Q6H PRN         oxyCODONE (ROXICODONE) tablet 5 mg  5 mg Oral Q4H PRN   5 mg at 21 2305    Or     oxyCODONE (ROXICODONE) tablet 10 mg  10 mg Oral Q4H PRN         Patient is already receiving anticoagulation with heparin, enoxaparin (LOVENOX), warfarin (COUMADIN)  or other anticoagulant medication   Does not apply Continuous PRN         Percutaneous Coronary Intervention orders placed (this is information for BPA alerting)   Does not apply DOES NOT GO TO MAR         rosuvastatin (CRESTOR) tablet 40 mg  40 mg Oral At Bedtime   40 mg at 21     senna-docusate (SENOKOT-S/PERICOLACE) 8.6-50 MG per tablet 1 tablet  1 tablet Oral BID PRN        Or     senna-docusate (SENOKOT-S/PERICOLACE) 8.6-50 MG per tablet 2 tablet  2 tablet Oral BID PRN         sodium chloride (PF) 0.9% PF flush 3 mL  3 mL Intracatheter Q8H   3 mL at 21 2304     sodium chloride (PF) 0.9% PF flush 3 mL  3 mL Intracatheter q1 min prn         No current [facility]-ordered outpatient medications on file.             Physical Exam:   Vital signs were reviewed:  Temperatures:  Current - Temp: 97.8  F (36.6  C); Max - Temp  Av.5  F (36.9  C)  Min: 97.8  F (36.6  C)  Max: 99.2  F (37.3  C)  Respiration range: Resp  Av  Min: 16  Max: 20  Pulse range: Pulse  Av.3  Min: 56  Max: 116  Blood pressure range: Systolic (24hrs), Av , Min:112 , Max:160   ; Diastolic (24hrs), Av, Min:54, Max:91    Pulse oximetry range: SpO2  Av.2 %  Min: 93 %  Max: 100 %    Intake/Output Summary (Last 24 hours) at 2021 1005  Last data filed at 2021 1216  Gross per 24 hour   Intake 0 ml   Output --   Net 0 ml     175 lbs 8 oz  Body mass index is 28.33 kg/m .   Body surface area is 1.93 meters squared.    Constitutional: appears stated age, in no apparent distress, appears to be well nourished  Eyes:  sclera anicteric, conjunctiva normal, no lesions on eyelids or lashes  ENT: normocephalic, without obvious abnormality, atraumatic, external ears without lesions  Pulmonary: clear to auscultation bilaterally, no wheezes, no rales, no increased work of breathing  Cardiovascular: JVP normal, regular rate, regular rhythm, normal S1 and S2, no S3, S4, no murmur appreciated, no lower extremity edema, RFA access site C/D/I, no hematoma   Gastrointestinal: abdominal exam benign, non-tender, no rigidity, no guarding  Neurologic: awake, alert, face symmetrical, moves all extremities  Skin: no abnormal rashes or lesions on limited exam, nails normal without discoloration or clubbing, no jaundice  Psychiatric: affect is normal, answers questions appropriately, oriented to self and place         Selected laboratory tests:   Laboratory test results personally reviewed:   CMP  Recent Labs   Lab 07/28/21  0813 07/28/21  0622 07/28/21  0156 07/27/21  2255 07/27/21  0933   NA  --  132*  --   --  135   POTASSIUM  --  4.2  --   --  4.4   CHLORIDE  --  104  --   --  105   CO2  --  25  --   --  24   ANIONGAP  --  3  --   --  6   * 118* 144* 166* 121*   BUN  --  18  --   --  24   CR  --  1.27*  --   --  1.35*   GFRESTIMATED  --  60*  --   --  56*   ALEC  --  9.2  --   --  9.6     CBC  Recent Labs   Lab 07/27/21  2208 07/27/21  1430 07/27/21  0933   WBC 10.9 16.0* 10.0   RBC 3.92* 4.63 4.41   HGB 10.9* 12.9* 12.3*   HCT 34.7* 40.8 38.9*   MCV 89 88 88   MCH 27.8 27.9 27.9   MCHC 31.4* 31.6 31.6   RDW 14.6 14.6 14.5    377 391     INRNo lab results found in last 7 days.  Lab Results   Component Value Date    TROPONIN 3.555 () 07/28/2021    TROPONIN 3.849 () 07/28/2021    TROPONIN 3.442 () 07/27/2021     Recent Labs   Lab Test 07/28/21  0622 07/27/21  1746   CHOL 129 148   HDL 44 38*   LDL 62 89   TRIG 116 105     Lab Results   Component Value Date    A1C 6.0 07/28/2021     No results found for: TSH         Selected  Imaging and Additional Data:   Additional data personally reviewed:  Recent Results (from the past 4320 hour(s))   Echocardiogram Limited   Result Value    LVEF  60-65%    Narrative    [id number]  [id number]  [id number]  [id number]^TRAVIS^DUKE^LAYA     [facility]  Echocardiography Laboratory  [address] [place] [address]     Name: SHIRIN RODRIGUEZ  MRN: [id number]  : 1960  Study Date: 2021 12:00 PM  Age: 61 yrs  Gender: Male  Patient Location: [facility]  Reason For Study: Chest Pain  Ordering Physician: DUKE ROBLES  Referring Physician: Yvonne Ref-Primary, Physician  Performed By: Jessica Gaston     BSA: 1.9 m2  Height: 66 in  Weight: 173 lb  HR: 77  BP: 129/69 mmHg  ______________________________________________________________________________  Procedure  Limited Portable Echo Adult. (Emergent exam, abbreviated study performed).  Optison (NDC #[id number]) given intravenously.  ______________________________________________________________________________  Interpretation Summary     The visual ejection fraction is 60-65%.  Left ventricular systolic function is normal.  No regional wall motion abnormalities noted.  ______________________________________________________________________________  Left Ventricle  The left ventricle is normal in size. The visual ejection fraction is 60-65%.  Left ventricular systolic function is normal. No regional wall motion  abnormalities noted.     Right Ventricle  The right ventricle is normal in size and function.     Atria  Normal left atrial size. Right atrial size is normal.     Mitral Valve  There is mild mitral annular calcification. There is trace mitral  regurgitation.     Tricuspid Valve  There is trace tricuspid regurgitation. The right ventricular systolic  pressure is approximated at 27.0 mmHg plus the right atrial pressure.     Aortic Valve  The aortic valve is trileaflet. There is trivial trileaflet aortic  sclerosis.  No aortic regurgitation is present. No hemodynamically significant valvular  aortic stenosis.     Pulmonic Valve  There is trace pulmonic valvular regurgitation.     Vessels  IVC diameter <2.1 cm collapsing >50% with sniff suggests a normal RA pressure  of 3 mmHg.     Pericardium  There is no pericardial effusion.     Rhythm  Sinus rhythm was noted.  ______________________________________________________________________________  Doppler Measurements & Calculations  MV E max sang: 86.9 cm/sec  MV A max sang: 106.5 cm/sec  MV E/A: 0.82  MV dec time: 0.14 sec  Ao V2 max: 163.9 cm/sec  Ao max PG: 10.7 mmHg  TR max sang: 259.9 cm/sec  TR max P.0 mmHg  E/E' av.4  Lateral E/e': 9.4  Medial E/e': 13.3     ______________________________________________________________________________  Report approved by: Jose Linares 2021 12:30 PM

## 2021-07-28 NOTE — PROVIDER NOTIFICATION
Paged admitting  Verbal order from Dr. Montague cardiology   Imdur 24 hour 30 mg #15 with 1 refill  and Moe .it will be out of pocket. to [facility] [phone]. PLease call me with questions and let me know if you will complete.

## 2021-07-29 ENCOUNTER — TELEPHONE (OUTPATIENT)
Dept: CARDIOLOGY | Facility: CLINIC | Age: 61
End: 2021-07-29

## 2021-07-29 DIAGNOSIS — I25.10 CAD (CORONARY ARTERY DISEASE): Primary | ICD-10-CM

## 2021-07-29 NOTE — PLAN OF CARE
Cardiac Rehab Discharge Summary    Reason for therapy discharge:    Discharged to home with outpatient therapy.    Progress towards therapy goal(s). See goals on Care Plan in [facility] electronic health record for goal details.  Goals partially met.  Barriers to achieving goals:   discharge from facility.    Therapy recommendation(s):    Home with OP CR

## 2021-07-29 NOTE — TELEPHONE ENCOUNTER
Patient was evaluated by cardiology while inpatient for chest pain with elevated troponin-NSTEMI. PMH: HLD, HTN, DM2, obesity. 7/27/21: Coronary angiogram via RFA demonstrating a small non-dominant RCA mid 100% . There was also severe narrowing of a small OM3, as well as a  of the apical LAD which wrapped around the apex and fed the distal 1/4-1/3 of the inferior septum. Apical LAD  was crossed using  technique and was opened, however due to the small size of the vessel, a balloon was not passed. Medical therapy was determined to be the only option. TTE 7/27/2021 LVEF 60-65% with normal wall motion, normal RV function, no significant valvular abnormalities. NICK this admission. Pt was discharged taking ASA and Plavix and will hold PTA Metformin x 72 hrs post angiogram. Writer attempted to call pt, but phone answered by his son and pt was resting. Son denies any medication questions. Pt has reportedly had no chest pain, SOB, fever or lightheadedness. RFA access site is healing without signs of infection, swelling or bleeding. Pt will be stating in the [place] x a minimal of 4 weeks, as directed by his cardiologist in [place], for further follow up and recovery. Pt is scheduled for cardiac rehab on 8/3/21 at 0800 and order placed for cardiology DURGA f/u visit in 7-10 days. Writer will call patient to discuss any post hospital d/c questions he may have, review medication changes, and confirm f/u appts. NADEEN Gonsalez RN.

## 2021-07-30 LAB
ATRIAL RATE - MUSE: 113 BPM
ATRIAL RATE - MUSE: 83 BPM
ATRIAL RATE - MUSE: 95 BPM
DIASTOLIC BLOOD PRESSURE - MUSE: NORMAL MMHG
INTERPRETATION ECG - MUSE: NORMAL
P AXIS - MUSE: 43 DEGREES
P AXIS - MUSE: 66 DEGREES
P AXIS - MUSE: 67 DEGREES
PR INTERVAL - MUSE: 150 MS
PR INTERVAL - MUSE: 162 MS
PR INTERVAL - MUSE: 162 MS
QRS DURATION - MUSE: 76 MS
QRS DURATION - MUSE: 78 MS
QRS DURATION - MUSE: 82 MS
QT - MUSE: 286 MS
QT - MUSE: 318 MS
QT - MUSE: 360 MS
QTC - MUSE: 392 MS
QTC - MUSE: 399 MS
QTC - MUSE: 423 MS
R AXIS - MUSE: -6 DEGREES
R AXIS - MUSE: 26 DEGREES
R AXIS - MUSE: 29 DEGREES
SYSTOLIC BLOOD PRESSURE - MUSE: NORMAL MMHG
T AXIS - MUSE: 15 DEGREES
T AXIS - MUSE: 18 DEGREES
T AXIS - MUSE: 5 DEGREES
VENTRICULAR RATE- MUSE: 113 BPM
VENTRICULAR RATE- MUSE: 83 BPM
VENTRICULAR RATE- MUSE: 95 BPM

## 2021-07-31 ENCOUNTER — APPOINTMENT (OUTPATIENT)
Dept: GENERAL RADIOLOGY | Facility: CLINIC | Age: 61
End: 2021-07-31
Attending: EMERGENCY MEDICINE

## 2021-07-31 ENCOUNTER — HOSPITAL ENCOUNTER (EMERGENCY)
Facility: CLINIC | Age: 61
Discharge: HOME | End: 2021-07-31
Attending: EMERGENCY MEDICINE | Admitting: EMERGENCY MEDICINE

## 2021-07-31 VITALS
HEART RATE: 66 BPM | OXYGEN SATURATION: 100 % | TEMPERATURE: 97.4 F | SYSTOLIC BLOOD PRESSURE: 102 MMHG | DIASTOLIC BLOOD PRESSURE: 69 MMHG | RESPIRATION RATE: 18 BRPM

## 2021-07-31 DIAGNOSIS — I25.119 CORONARY ARTERY DISEASE INVOLVING NATIVE HEART WITH ANGINA PECTORIS, UNSPECIFIED VESSEL OR LESION TYPE (H): ICD-10-CM

## 2021-07-31 DIAGNOSIS — R07.9 CHEST PAIN, UNSPECIFIED TYPE: ICD-10-CM

## 2021-07-31 DIAGNOSIS — R79.89 ELEVATED TROPONIN: ICD-10-CM

## 2021-07-31 DIAGNOSIS — I21.4 NSTEMI (NON-ST ELEVATED MYOCARDIAL INFARCTION) (H): ICD-10-CM

## 2021-07-31 LAB
ANION GAP SERPL CALCULATED.3IONS-SCNC: 5 MMOL/L (ref 3–14)
BASOPHILS # BLD AUTO: 0.1 10E3/UL (ref 0–0.2)
BASOPHILS NFR BLD AUTO: 0 %
BUN SERPL-MCNC: 28 MG/DL (ref 7–30)
CALCIUM SERPL-MCNC: 9.6 MG/DL (ref 8.5–10.1)
CHLORIDE BLD-SCNC: 103 MMOL/L (ref 94–109)
CO2 SERPL-SCNC: 25 MMOL/L (ref 20–32)
CREAT SERPL-MCNC: 1.37 MG/DL (ref 0.66–1.25)
EOSINOPHIL # BLD AUTO: 0.5 10E3/UL (ref 0–0.7)
EOSINOPHIL NFR BLD AUTO: 5 %
ERYTHROCYTE [DISTWIDTH] IN BLOOD BY AUTOMATED COUNT: 14.2 % (ref 10–15)
GFR SERPL CREATININE-BSD FRML MDRD: 55 ML/MIN/1.73M2
GLUCOSE BLD-MCNC: 129 MG/DL (ref 70–99)
HCT VFR BLD AUTO: 36.3 % (ref 40–53)
HGB BLD-MCNC: 11.7 G/DL (ref 13.3–17.7)
HOLD SPECIMEN: NORMAL
HOLD SPECIMEN: NORMAL
IMM GRANULOCYTES # BLD: 0 10E3/UL
IMM GRANULOCYTES NFR BLD: 0 %
LYMPHOCYTES # BLD AUTO: 1.3 10E3/UL (ref 0.8–5.3)
LYMPHOCYTES NFR BLD AUTO: 11 %
MCH RBC QN AUTO: 27.7 PG (ref 26.5–33)
MCHC RBC AUTO-ENTMCNC: 32.2 G/DL (ref 31.5–36.5)
MCV RBC AUTO: 86 FL (ref 78–100)
MONOCYTES # BLD AUTO: 0.9 10E3/UL (ref 0–1.3)
MONOCYTES NFR BLD AUTO: 8 %
NEUTROPHILS # BLD AUTO: 8.5 10E3/UL (ref 1.6–8.3)
NEUTROPHILS NFR BLD AUTO: 76 %
NRBC # BLD AUTO: 0 10E3/UL
NRBC BLD AUTO-RTO: 0 /100
PLATELET # BLD AUTO: 482 10E3/UL (ref 150–450)
POTASSIUM BLD-SCNC: 4.9 MMOL/L (ref 3.4–5.3)
RBC # BLD AUTO: 4.22 10E6/UL (ref 4.4–5.9)
SODIUM SERPL-SCNC: 133 MMOL/L (ref 133–144)
TROPONIN I SERPL-MCNC: 0.84 UG/L (ref 0–0.04)
TROPONIN I SERPL-MCNC: 0.98 UG/L (ref 0–0.04)
WBC # BLD AUTO: 11.2 10E3/UL (ref 4–11)

## 2021-07-31 PROCEDURE — 85025 COMPLETE CBC W/AUTO DIFF WBC: CPT | Performed by: EMERGENCY MEDICINE

## 2021-07-31 PROCEDURE — 84484 ASSAY OF TROPONIN QUANT: CPT | Mod: 91 | Performed by: EMERGENCY MEDICINE

## 2021-07-31 PROCEDURE — 93005 ELECTROCARDIOGRAM TRACING: CPT

## 2021-07-31 PROCEDURE — 250N000013 HC RX MED GY IP 250 OP 250 PS 637: Performed by: EMERGENCY MEDICINE

## 2021-07-31 PROCEDURE — 84484 ASSAY OF TROPONIN QUANT: CPT | Performed by: EMERGENCY MEDICINE

## 2021-07-31 PROCEDURE — 80048 BASIC METABOLIC PNL TOTAL CA: CPT | Performed by: EMERGENCY MEDICINE

## 2021-07-31 PROCEDURE — 99285 EMERGENCY DEPT VISIT HI MDM: CPT | Mod: 25

## 2021-07-31 PROCEDURE — 71046 X-RAY EXAM CHEST 2 VIEWS: CPT

## 2021-07-31 PROCEDURE — 36415 COLL VENOUS BLD VENIPUNCTURE: CPT | Performed by: EMERGENCY MEDICINE

## 2021-07-31 RX ORDER — NITROGLYCERIN 0.4 MG/1
0.4 TABLET SUBLINGUAL EVERY 5 MIN PRN
Status: DISCONTINUED | OUTPATIENT
Start: 2021-07-31 | End: 2021-07-31 | Stop reason: HOSPADM

## 2021-07-31 RX ORDER — ASPIRIN 81 MG/1
324 TABLET, CHEWABLE ORAL ONCE
Status: COMPLETED | OUTPATIENT
Start: 2021-07-31 | End: 2021-07-31

## 2021-07-31 RX ORDER — ISOSORBIDE MONONITRATE 30 MG/1
60 TABLET, EXTENDED RELEASE ORAL DAILY
Qty: 15 TABLET | Refills: 1 | Status: SHIPPED | OUTPATIENT
Start: 2021-07-31 | End: 2021-08-03

## 2021-07-31 RX ADMIN — NITROGLYCERIN 0.4 MG: 0.4 TABLET SUBLINGUAL at 05:28

## 2021-07-31 RX ADMIN — ASPIRIN 81 MG CHEWABLE TABLET 324 MG: 81 TABLET CHEWABLE at 05:28

## 2021-07-31 ASSESSMENT — ENCOUNTER SYMPTOMS
DIAPHORESIS: 0
SHORTNESS OF BREATH: 0
NAUSEA: 0

## 2021-07-31 NOTE — ED PROVIDER NOTES
History   Chief Complaint:  Chest Pain      HPI   Sabino Jerry is a 61 year old male with a history of NSTEMI who presents with chest pain. The patient reports chest pain for the last 4 hours and, once he took nitroglycerin, the pain improved. This prompted him to come to the emergency department for evaluation. The pain is still present, though improved. Patient denies nausea, diaphoresis, and shortness of breath. The pain began when the patient was sleeping and was a 7 out of 10 in severity at its worst.    Review of Systems   Constitutional: Negative for diaphoresis.   Respiratory: Negative for shortness of breath.    Cardiovascular: Positive for chest pain.   Gastrointestinal: Negative for nausea.   All other systems reviewed and negative      Allergies:  Penicillins    Medications:  Aspirin 81 mg  Plavix  Flexeril  Imdur  Metformin  Nitroglycerin    Past Medical History:    NSTEMI  Acute pericarditis     Past Surgical History:    Heart catheterization  PCI Angioplasty     Social History:  The patient presents to the emergency department with a friend.    Physical Exam     Patient Vitals for the past 24 hrs:   BP Temp Pulse Resp SpO2   07/31/21 0452 113/67 -- 65 24 97 %   07/31/21 0207 117/84 97.4  F (36.3  C) 72 18 99 %       Physical Exam  Constitutional: Alert, attentive  HENT:    Nose: Nose normal.    Mouth/Throat: Oropharynx is clear, mucous membranes are moist   Eyes: EOM are normal.   CV: regular rate and rhythm; no murmurs, rubs or gallups  Chest: Effort normal and breath sounds normal.   GI:  There is no tenderness. No distension. Normal bowel sounds  MSK: Normal range of motion.   Neurological: Alert, attentive  Skin: Skin is warm and dry.      Emergency Department Course   ECG  ECG taken at 0214, ECG read at 0225  Normal sinus rhythm.  No significant change as compared to prior, dated 7/27/21.  Rate 68 bpm. SD interval 162 ms. QRS duration 82 ms. QT/QTc 378/401 ms. P-R-T axes 65 22 10.      Results for orders placed or performed during the hospital encounter of 07/31/21 (from the past 24 hour(s))   EKG 12 lead   Result Value Ref Range    Systolic Blood Pressure  mmHg    Diastolic Blood Pressure  mmHg    Ventricular Rate 68 BPM    Atrial Rate 68 BPM    MO Interval 162 ms    QRS Duration 82 ms     ms    QTc 401 ms    P Axis 65 degrees    R AXIS 22 degrees    T Axis 10 degrees    Interpretation ECG       Sinus rhythm  Normal ECG  When compared with ECG of 28-JUL-2021 09:34,  No significant change was found     Extra Tube ([place] Draw)    Narrative    The following orders were created for panel order Extra Tube ([place] Draw).  Procedure                               Abnormality         Status                     ---------                               -----------         ------                     Extra Blue Top Tube[[id number]]                              Final result               Extra Red Top Tube[[id number]]                               Final result                 Please view results for these tests on the individual orders.   CBC + differential    Narrative    The following orders were created for panel order CBC + differential.  Procedure                               Abnormality         Status                     ---------                               -----------         ------                     CBC with platelets and d...[[id number]]  Abnormal            Final result                 Please view results for these tests on the individual orders.   Basic metabolic panel (BMP)   Result Value Ref Range    Sodium 133 133 - 144 mmol/L    Potassium 4.9 3.4 - 5.3 mmol/L    Chloride 103 94 - 109 mmol/L    Carbon Dioxide (CO2) 25 20 - 32 mmol/L    Anion Gap 5 3 - 14 mmol/L    Urea Nitrogen 28 7 - 30 mg/dL    Creatinine 1.37 (H) 0.66 - 1.25 mg/dL    Calcium 9.6 8.5 - 10.1 mg/dL    Glucose 129 (H) 70 - 99 mg/dL    GFR Estimate 55 (L) >60 mL/min/1.73m2   Troponin I (now)   Result Value Ref Range     Troponin I 0.984 (HH) 0.000 - 0.045 ug/L   Extra Blue Top Tube   Result Value Ref Range    Hold Specimen JIC    Extra Red Top Tube   Result Value Ref Range    Hold Specimen JIC    CBC with platelets and differential   Result Value Ref Range    WBC Count 11.2 (H) 4.0 - 11.0 10e3/uL    RBC Count 4.22 (L) 4.40 - 5.90 10e6/uL    Hemoglobin 11.7 (L) 13.3 - 17.7 g/dL    Hematocrit 36.3 (L) 40.0 - 53.0 %    MCV 86 78 - 100 fL    MCH 27.7 26.5 - 33.0 pg    MCHC 32.2 31.5 - 36.5 g/dL    RDW 14.2 10.0 - 15.0 %    Platelet Count 482 (H) 150 - 450 10e3/uL    % Neutrophils 76 %    % Lymphocytes 11 %    % Monocytes 8 %    % Eosinophils 5 %    % Basophils 0 %    % Immature Granulocytes 0 %    NRBCs per 100 WBC 0 <1 /100    Absolute Neutrophils 8.5 (H) 1.6 - 8.3 10e3/uL    Absolute Lymphocytes 1.3 0.8 - 5.3 10e3/uL    Absolute Monocytes 0.9 0.0 - 1.3 10e3/uL    Absolute Eosinophils 0.5 0.0 - 0.7 10e3/uL    Absolute Basophils 0.1 0.0 - 0.2 10e3/uL    Absolute Immature Granulocytes 0.0 <=0.0 10e3/uL    Absolute NRBCs 0.0 10e3/uL   XR Chest 2 Views    Narrative    EXAM: XR CHEST 2 VIEW  LOCATION: [facility]  DATE/TIME: 07/31/2021, 5:18 AM    INDICATION: Chest pain.  COMPARISON: 07/27/2021.      Impression    IMPRESSION: Normal heart size and pulmonary vascularity. No acute infiltrates or consolidation. Small band of linear atelectasis right midlung. Aortic calcification. No significant bony abnormalities. No change from the prior study.     Extra Tube ([place] Draw)    Narrative    The following orders were created for panel order Extra Tube ([place] Draw).  Procedure                               Abnormality         Status                     ---------                               -----------         ------                     Extra Green Top (Lithium...[[id number]]                                                   Please view results for these tests on the individual orders.   Troponin I (now)   Result  Value Ref Range    Troponin I 0.844 () 0.000 - 0.045 ug/L             Emergency Department Course:  Reviewed:  I reviewed the patient's nursing notes, vitals, past medical records, and Care Everywhere.     Assessments:  0450 I performed an exam of the patient and obtained history, as documented above.     Consults:    I consulted with Dr. Cherry, of cardiology.    Interventions:  0528 Nitroglycerin 0.4 mg SL   Aspirin 324 mg PO    Disposition:  The patient was discharged to home.     Impression & Plan      Medical Decision Making:  This is a pleasant 61-year-old male with history of recent NSTEMI, diabetes, hypertension, and hyperlipidemia, who presents after an episode of chest pain while at rest.  Symptoms are concerning for possible unstable angina, although they are resolved with nitroglycerin and did not recur.  Of note, his multivessel disease was not amenable to angioplasty per review of 7/27/2021 catheterization.  Screening labs show elevated troponin, however this is consistent with falling troponin from recent NSTEMI.  Furthermore, his 2-hour troponin is down further from initial.  He remains chest pain-free in the department after NTG.  I consulted cardiology regarding his post cath angina.  He is currently taking aspirin, Plavix, and ACE inhibitor, beta-blocker, and Imdur, and has been taking these consistently since discharge.  I discussed with patient possibility of being admitted for further medication management, including possible addition of amlodipine or increasing of his Imdur.  He is visiting from [place] and does not have health insurance, and is adamant that he be discharged.  He understands that symptoms could represent angina and that he would need to return should his chest pain worsen or if other concerning symptoms evolve.  He demonstrates decision-making capacity and understanding of the above.  Plan increase Imdur to 60 mg daily and cardiology follow-up on Tuesday as scheduled.  Return  precautions for chest pain, shortness of breath, or any other concerns.    Diagnosis:      ICD-10-CM   1. Chest pain, unspecified type  R07.9   2. Coronary artery disease involving native heart with angina pectoris, unspecified vessel or lesion type (H)  I25.119       3. Elevated troponin  R77.8           Scribe Disclosure:  Kaci YEE, am serving as a scribe at 4:50 AM on 7/31/2021 to document services personally performed by Luis Sosa MD based on my observations and the provider's statements to me.     July 31, 2021   [facility]     Luis Sosa MD  07/31/21 0743

## 2021-08-01 NOTE — PROGRESS NOTES
HISTORY OF PRESENT ILLNESS:    This is a 61 year old male who follows with Dr. Montague at [facility]  His past medical history includes:  Coronary artery disease, hypertension, hyperlipidemia, type II diabetes, obesity, and CKD.    Mr Jerry is visiting from [place] and presented to [facility] two weeks ago with decreased exercise capacity and right-sided chest pain which worsened with deep inspiration.  He was found to have a NSTEMI (troponin peak 3.8)  His ECHO showed LVEF 60-65% without regional wall motion abnormalities, normal RV function, RVSP 27 mmHg, and no significant valvular pathology.  Coronary angiography (7/27/21) revealed an occluded small nondominant mid RCA, 99% small OM3 lesion, occluded distal LAD which wrapped around the apex and fed the distal part of the inferoseptum  He was also found to have mild proximal LAD, left main, and CFX disease.  The distal LAD lesion was opened by using the  technique, but a balloon was not passed due to the small size of the vessel and medical therapy was determined to be the only option.  He was started on Plavix, metoprolol, and Imdur    He was seen in the ED (7/31/21) again for chest pain which was relieved with NTG. There was no significant EKG changes.  There was no significant pulmonary vascular congestion on his CXR. His Imdur was increased to 60 mg    Our visit today is for further review    Mr Jerry speaks Tamil and comes in with his son-in-law who interprets for him and assists with his history and symptoms.  He has not had any further chest pain.  He is avoiding strenuous activity now.  He is now planning on staying in [place] for at least 1 month due to the COVID pandemic in [place].  He denies shortness of breath, orthopnea, palpitations, or peripheral edema.  They are hoping to start cardiac rehab soon.  The son-in-law tells me that they never got a prescription for Metoprolol but has been faithful in taking his Imdur and Plavix      VITAL  SIGNS:  BP: 112/60  Pulse: 90  Weight:  173 lbs (stable)  BMI: 28        IMPRESSION AND PLAN:    Coronary Artery Disease:  -s/p NSTEMI (7/27/21) and evidence of occluded distal LAD, occluded small nondominant mid RCA, and 99% disease in small OM3  An attempt at revascularization of distal LAD was done only with wire due to small vessel size  -LVEF 60%  -no further chest pain  -on Imdur 60 mg,  Plavix for 1 yr, ASA   -will start low dose Metoprolol XR to take in the evening  -cardiac rehab    Hypertension:  -BP controlled  -he is to call with any lightheadedness with the newly added Metoprolol  -return in 1 month with Dr. Montague    CKD:  -stable Cr. 1.3 post cath    Type II diabetes  -hgb A1C 6%    The total time for the visit today was 35 minutes which includes patient visit, reviewing of records, discussion, and placing of orders of the outpatient coordination of cardiovascular care as described.  The level of medical decision making during this visit was of moderate complexity.  Thank you for allowing me to participate in their care.    Orders Placed This Encounter   Procedures     Follow-Up with Cardiologist     Follow-Up with Cardiac Advanced Practice Provider       Orders Placed This Encounter   Medications     metoprolol succinate ER (TOPROL-XL) 25 MG 24 hr tablet     Sig: Take 1 tablet (25 mg) by mouth every evening     Dispense:  30 tablet     Refill:  4     clopidogrel (PLAVIX) 75 MG tablet     Sig: Take 1 tablet (75 mg) by mouth daily     Dispense:  30 tablet     Refill:  0     isosorbide mononitrate (IMDUR) 30 MG 24 hr tablet     Sig: Take 2 tablets (60 mg) by mouth daily     Dispense:  60 tablet     Refill:  1       Medications Discontinued During This Encounter   Medication Reason     clopidogrel (PLAVIX) 75 MG tablet Reorder     isosorbide mononitrate (IMDUR) 30 MG 24 hr tablet Reorder         Encounter Diagnoses   Name Primary?     Coronary artery disease involving native coronary artery of native heart  without angina pectoris      NSTEMI (non-ST elevated myocardial infarction) (H)        CURRENT MEDICATIONS:  Current Outpatient Medications   Medication Sig Dispense Refill     aspirin (ASA) 81 MG EC tablet Take 1 tablet (81 mg) by mouth daily 30 tablet 0     clopidogrel (PLAVIX) 75 MG tablet Take 1 tablet (75 mg) by mouth daily 30 tablet 0     cyclobenzaprine (FLEXERIL) 5 MG tablet Take 1 tablet (5 mg) by mouth at bedtime as needed, may repeat once for muscle spasms 20 tablet 0     diclofenac (VOLTAREN) 1 % topical gel Apply 2 g topically 4 times daily 100 g 0     isosorbide mononitrate (IMDUR) 30 MG 24 hr tablet Take 2 tablets (60 mg) by mouth daily 60 tablet 1     metFORMIN (GLUCOPHAGE) 850 MG tablet Take 1 tablet (850 mg) by mouth daily (with dinner)       metoprolol succinate ER (TOPROL-XL) 25 MG 24 hr tablet Take 1 tablet (25 mg) by mouth every evening 30 tablet 4     nitroGLYcerin (NITROSTAT) 0.4 MG sublingual tablet For chest pain place 1 tablet under the tongue every 5 minutes for 3 doses. If symptoms persist 5 minutes after 1st dose call 911. 15 tablet 1     NONFORMULARY Take 1 tablet by mouth daily Pt's combo pill from [place]: atorvastatin 10 mg + fenofibrate 160 mg       NONFORMULARY Take 1 tablet by mouth daily Pt's combo pill from [place]: methylcobolamin 1500 mcg + alpha lipoic acid 100 mg + pyridoxine 3 mg + folic acid 1.5 mg       NONFORMULARY Take 1 tablet by mouth daily Pt's combo pill from [place]: telmisartan 40 mg + metoprolol succinate 47.5 mg       HOLD MEDICATION IN ORDER SET 1 each daily (Patient not taking: Reported on 8/3/2021)         ALLERGIES     Allergies   Allergen Reactions     Penicillins        PAST MEDICAL HISTORY:  No past medical history on file.    PAST SURGICAL HISTORY:  Past Surgical History:   Procedure Laterality Date     CV HEART CATHETERIZATION WITH POSSIBLE INTERVENTION N/A 7/27/2021    Procedure: coronary angiogram;  Surgeon: Joshua Mejia MD;  [facility]  CARDIAC CATH LAB     CV LEFT HEART CATH N/A 7/27/2021    Procedure: Left Heart Cath;  Surgeon: Joshua Mejia MD;  Location: [facility] CARDIAC CATH [facility]     CV PCI ANGIOPLASTY N/A 7/27/2021    Procedure: Percutaneous Transluminal Angioplasty;  Surgeon: Joshua Mejia MD;  Location: [facility] CARDIAC CATH LAB       FAMILY HISTORY:  No family history on file.    SOCIAL HISTORY:  Social History     Socioeconomic History     Marital status:      Spouse name: None     Number of children: None     Years of education: None     Highest education level: None   Occupational History     None   Tobacco Use     Smoking status: Never Smoker     Smokeless tobacco: Never Used   Substance and Sexual Activity     Alcohol use: None     Drug use: None     Sexual activity: None   Other Topics Concern     None   Social History Narrative     None     Social Determinants of Health     Financial Resource Strain:      Difficulty of Paying Living Expenses:    Food Insecurity:      Worried About Running Out of Food in the Last Year:      Ran Out of Food in the Last Year:    Transportation Needs:      Lack of Transportation (Medical):      Lack of Transportation (Non-Medical):    Physical Activity:      Days of Exercise per Week:      Minutes of Exercise per Session:    Stress:      Feeling of Stress :    Social Connections:      Frequency of Communication with Friends and Family:      Frequency of Social Gatherings with Friends and Family:      Attends Taoism Services:      Active Member of Clubs or Organizations:      Attends Club or Organization Meetings:      Marital Status:    Intimate Partner Violence:      Fear of Current or Ex-Partner:      Emotionally Abused:      Physically Abused:      Sexually Abused:        Review of Systems:  Skin:  Negative       Eyes:  Negative      ENT:  Negative      Respiratory:  Negative       Cardiovascular:    Positive for chest discomfort at times  Gastroenterology: Positive for heartburn   "  Genitourinary:  not assessed      Musculoskeletal:  Negative      Neurologic:  Negative      Psychiatric:  Negative      Heme/Lymph/Imm:  Negative      Endocrine:  Positive for diabetes      Physical Exam:  Vitals: /60   Pulse 90   Ht 1.651 m (5' 5\")   Wt 78.5 kg (173 lb)   BMI 28.79 kg/m      Constitutional:  cooperative;well nourished        Skin:  warm and dry to the touch          Head:  normocephalic        Eyes:  pupils equal and round        Lymph:      ENT:  no pallor or cyanosis        Neck:  JVP normal;no carotid bruit        Respiratory:  clear to auscultation;normal respiratory excursion         Cardiac: regular rhythm;normal S1 and S2     no presence of murmur          pulses full and equal                                        GI:  abdomen soft        Extremities and Muscular Skeletal:  no edema              Neurological:  affect appropriate        Psych:  Alert and Oriented x 3          CC  Tanesha Rehman, APRN CNP  [address]  [place] [address]                  "

## 2021-08-02 LAB
ATRIAL RATE - MUSE: 68 BPM
DIASTOLIC BLOOD PRESSURE - MUSE: NORMAL MMHG
INTERPRETATION ECG - MUSE: NORMAL
P AXIS - MUSE: 65 DEGREES
PR INTERVAL - MUSE: 162 MS
QRS DURATION - MUSE: 82 MS
QT - MUSE: 378 MS
QTC - MUSE: 401 MS
R AXIS - MUSE: 22 DEGREES
SYSTOLIC BLOOD PRESSURE - MUSE: NORMAL MMHG
T AXIS - MUSE: 10 DEGREES
VENTRICULAR RATE- MUSE: 68 BPM

## 2021-08-03 ENCOUNTER — OFFICE VISIT (OUTPATIENT)
Dept: CARDIOLOGY | Facility: CLINIC | Age: 61
End: 2021-08-03
Attending: NURSE PRACTITIONER

## 2021-08-03 VITALS
SYSTOLIC BLOOD PRESSURE: 112 MMHG | HEIGHT: 65 IN | WEIGHT: 173 LBS | BODY MASS INDEX: 28.82 KG/M2 | DIASTOLIC BLOOD PRESSURE: 60 MMHG | HEART RATE: 90 BPM

## 2021-08-03 DIAGNOSIS — I21.4 NSTEMI (NON-ST ELEVATED MYOCARDIAL INFARCTION) (H): ICD-10-CM

## 2021-08-03 DIAGNOSIS — I25.10 CORONARY ARTERY DISEASE INVOLVING NATIVE CORONARY ARTERY OF NATIVE HEART WITHOUT ANGINA PECTORIS: ICD-10-CM

## 2021-08-03 PROCEDURE — 99214 OFFICE O/P EST MOD 30 MIN: CPT | Performed by: NURSE PRACTITIONER

## 2021-08-03 RX ORDER — ISOSORBIDE MONONITRATE 30 MG/1
60 TABLET, EXTENDED RELEASE ORAL DAILY
Qty: 60 TABLET | Refills: 1 | Status: SHIPPED | OUTPATIENT
Start: 2021-08-03

## 2021-08-03 RX ORDER — METOPROLOL SUCCINATE 25 MG/1
25 TABLET, EXTENDED RELEASE ORAL EVERY EVENING
Qty: 30 TABLET | Refills: 4 | Status: SHIPPED | OUTPATIENT
Start: 2021-08-03

## 2021-08-03 RX ORDER — CLOPIDOGREL BISULFATE 75 MG/1
75 TABLET ORAL DAILY
Qty: 30 TABLET | Refills: 0 | Status: SHIPPED | OUTPATIENT
Start: 2021-08-03

## 2021-08-03 ASSESSMENT — MIFFLIN-ST. JEOR: SCORE: 1511.6

## 2021-08-03 NOTE — LETTER
8/3/2021    Physician No Ref-Primary  No address on file    RE: Sabino Jerry       Dear Colleague,    I had the pleasure of seeing Sabino Jerry in the [facility].    HISTORY OF PRESENT ILLNESS:    This is a 61 year old male who follows with Dr. Montague at [facility]  His past medical history includes:  Coronary artery disease, hypertension, hyperlipidemia, type II diabetes, obesity, and CKD.    Mr Jerry is visiting from [place] and presented to [facility] two weeks ago with decreased exercise capacity and right-sided chest pain which worsened with deep inspiration.  He was found to have a NSTEMI (troponin peak 3.8)  His ECHO showed LVEF 60-65% without regional wall motion abnormalities, normal RV function, RVSP 27 mmHg, and no significant valvular pathology.  Coronary angiography (7/27/21) revealed an occluded small nondominant mid RCA, 99% small OM3 lesion, occluded distal LAD which wrapped around the apex and fed the distal part of the inferoseptum  He was also found to have mild proximal LAD, left main, and CFX disease.  The distal LAD lesion was opened by using the  technique, but a balloon was not passed due to the small size of the vessel and medical therapy was determined to be the only option.  He was started on Plavix, metoprolol, and Imdur    He was seen in the ED (7/31/21) again for chest pain which was relieved with NTG. There was no significant EKG changes.  There was no significant pulmonary vascular congestion on his CXR. His Imdur was increased to 60 mg    Our visit today is for further review    Mr Jerry speaks Tamil and comes in with his son-in-law who interprets for him and assists with his history and symptoms.  He has not had any further chest pain.  He is avoiding strenuous activity now.  He is now planning on staying in [place] for at least 1 month due to the COVID pandemic in [place].  He denies shortness of breath,  orthopnea, palpitations, or peripheral edema.  They are hoping to start cardiac rehab soon.  The son-in-law tells me that they never got a prescription for Metoprolol but has been faithful in taking his Imdur and Plavix      VITAL SIGNS:  BP: 112/60  Pulse: 90  Weight:  173 lbs (stable)  BMI: 28        IMPRESSION AND PLAN:    Coronary Artery Disease:  -s/p NSTEMI (7/27/21) and evidence of occluded distal LAD, occluded small nondominant mid RCA, and 99% disease in small OM3  An attempt at revascularization of distal LAD was done only with wire due to small vessel size  -LVEF 60%  -no further chest pain  -on Imdur 60 mg,  Plavix for 1 yr, ASA   -will start low dose Metoprolol XR to take in the evening  -cardiac rehab    Hypertension:  -BP controlled  -he is to call with any lightheadedness with the newly added Metoprolol  -return in 1 month with Dr. Montague    CKD:  -stable Cr. 1.3 post cath    Type II diabetes  -hgb A1C 6%    The total time for the visit today was 35 minutes which includes patient visit, reviewing of records, discussion, and placing of orders of the outpatient coordination of cardiovascular care as described.  The level of medical decision making during this visit was of moderate complexity.  Thank you for allowing me to participate in their care.    Orders Placed This Encounter   Procedures     Follow-Up with Cardiologist     Follow-Up with Cardiac Advanced Practice Provider       Orders Placed This Encounter   Medications     metoprolol succinate ER (TOPROL-XL) 25 MG 24 hr tablet     Sig: Take 1 tablet (25 mg) by mouth every evening     Dispense:  30 tablet     Refill:  4     clopidogrel (PLAVIX) 75 MG tablet     Sig: Take 1 tablet (75 mg) by mouth daily     Dispense:  30 tablet     Refill:  0     isosorbide mononitrate (IMDUR) 30 MG 24 hr tablet     Sig: Take 2 tablets (60 mg) by mouth daily     Dispense:  60 tablet     Refill:  1       Medications Discontinued During This Encounter   Medication Reason      clopidogrel (PLAVIX) 75 MG tablet Reorder     isosorbide mononitrate (IMDUR) 30 MG 24 hr tablet Reorder         Encounter Diagnoses   Name Primary?     Coronary artery disease involving native coronary artery of native heart without angina pectoris      NSTEMI (non-ST elevated myocardial infarction) (H)        CURRENT MEDICATIONS:  Current Outpatient Medications   Medication Sig Dispense Refill     aspirin (ASA) 81 MG EC tablet Take 1 tablet (81 mg) by mouth daily 30 tablet 0     clopidogrel (PLAVIX) 75 MG tablet Take 1 tablet (75 mg) by mouth daily 30 tablet 0     cyclobenzaprine (FLEXERIL) 5 MG tablet Take 1 tablet (5 mg) by mouth at bedtime as needed, may repeat once for muscle spasms 20 tablet 0     diclofenac (VOLTAREN) 1 % topical gel Apply 2 g topically 4 times daily 100 g 0     isosorbide mononitrate (IMDUR) 30 MG 24 hr tablet Take 2 tablets (60 mg) by mouth daily 60 tablet 1     metFORMIN (GLUCOPHAGE) 850 MG tablet Take 1 tablet (850 mg) by mouth daily (with dinner)       metoprolol succinate ER (TOPROL-XL) 25 MG 24 hr tablet Take 1 tablet (25 mg) by mouth every evening 30 tablet 4     nitroGLYcerin (NITROSTAT) 0.4 MG sublingual tablet For chest pain place 1 tablet under the tongue every 5 minutes for 3 doses. If symptoms persist 5 minutes after 1st dose call 911. 15 tablet 1     NONFORMULARY Take 1 tablet by mouth daily Pt's combo pill from [place]: atorvastatin 10 mg + fenofibrate 160 mg       NONFORMULARY Take 1 tablet by mouth daily Pt's combo pill from [place]: methylcobolamin 1500 mcg + alpha lipoic acid 100 mg + pyridoxine 3 mg + folic acid 1.5 mg       NONFORMULARY Take 1 tablet by mouth daily Pt's combo pill from [place]: telmisartan 40 mg + metoprolol succinate 47.5 mg       HOLD MEDICATION IN ORDER SET 1 each daily (Patient not taking: Reported on 8/3/2021)         ALLERGIES     Allergies   Allergen Reactions     Penicillins        PAST MEDICAL HISTORY:  No past medical history on file.    PAST  SURGICAL HISTORY:  Past Surgical History:   Procedure Laterality Date     CV HEART CATHETERIZATION WITH POSSIBLE INTERVENTION N/A 7/27/2021    Procedure: coronary angiogram;  Surgeon: Joshua Mejia MD;  Location:  HEART CARDIAC CATH LAB     CV LEFT HEART CATH N/A 7/27/2021    Procedure: Left Heart Cath;  Surgeon: Joshua Mejia MD;  Location:  HEART CARDIAC CATH LAB     CV PCI ANGIOPLASTY N/A 7/27/2021    Procedure: Percutaneous Transluminal Angioplasty;  Surgeon: Joshua Mejia MD;  Location: [facility]       FAMILY HISTORY:  No family history on file.    SOCIAL HISTORY:  Social History     Socioeconomic History     Marital status:      Spouse name: None     Number of children: None     Years of education: None     Highest education level: None   Occupational History     None   Tobacco Use     Smoking status: Never Smoker     Smokeless tobacco: Never Used   Substance and Sexual Activity     Alcohol use: None     Drug use: None     Sexual activity: None   Other Topics Concern     None   Social History Narrative     None     Social Determinants of Health     Financial Resource Strain:      Difficulty of Paying Living Expenses:    Food Insecurity:      Worried About Running Out of Food in the Last Year:      Ran Out of Food in the Last Year:    Transportation Needs:      Lack of Transportation (Medical):      Lack of Transportation (Non-Medical):    Physical Activity:      Days of Exercise per Week:      Minutes of Exercise per Session:    Stress:      Feeling of Stress :    Social Connections:      Frequency of Communication with Friends and Family:      Frequency of Social Gatherings with Friends and Family:      Attends [employer] Services:      Active Member of Clubs or Organizations:      Attends Club or Organization Meetings:      Marital Status:    Intimate Partner Violence:      Fear of Current or Ex-Partner:      Emotionally Abused:      Physically Abused:      Sexually  "Abused:        Review of Systems:  Skin:  Negative       Eyes:  Negative      ENT:  Negative      Respiratory:  Negative       Cardiovascular:    Positive for chest discomfort at times  Gastroenterology: Positive for heartburn    Genitourinary:  not assessed      Musculoskeletal:  Negative      Neurologic:  Negative      Psychiatric:  Negative      Heme/Lymph/Imm:  Negative      Endocrine:  Positive for diabetes      Physical Exam:  Vitals: /60   Pulse 90   Ht 1.651 m (5' 5\")   Wt 78.5 kg (173 lb)   BMI 28.79 kg/m      Constitutional:  cooperative;well nourished        Skin:  warm and dry to the touch          Head:  normocephalic        Eyes:  pupils equal and round        Lymph:      ENT:  no pallor or cyanosis        Neck:  JVP normal;no carotid bruit        Respiratory:  clear to auscultation;normal respiratory excursion         Cardiac: regular rhythm;normal S1 and S2     no presence of murmur          pulses full and equal                                        GI:  abdomen soft        Extremities and Muscular Skeletal:  no edema              Neurological:  affect appropriate        Psych:  Alert and Oriented x 3          CC  OLVIN Ledesma CNP  [address]  [place] [id number]                      Thank you for allowing me to participate in the care of your patient.      Sincerely,     OLVIN Ellis CNP     [facility]  cc:   OLVIN Ledesma CNP  [address]  [place] [id number]        "

## 2021-08-22 ENCOUNTER — HEALTH MAINTENANCE LETTER (OUTPATIENT)
Age: 61
End: 2021-08-22

## 2021-10-17 ENCOUNTER — HEALTH MAINTENANCE LETTER (OUTPATIENT)
Age: 61
End: 2021-10-17

## 2021-12-12 ENCOUNTER — HEALTH MAINTENANCE LETTER (OUTPATIENT)
Age: 61
End: 2021-12-12

## 2022-04-03 ENCOUNTER — HEALTH MAINTENANCE LETTER (OUTPATIENT)
Age: 62
End: 2022-04-03

## 2022-07-24 ENCOUNTER — HEALTH MAINTENANCE LETTER (OUTPATIENT)
Age: 62
End: 2022-07-24

## 2022-08-23 NOTE — Clinical Note
Guide across the lesion to left anterior descending and distal left anterior descending.  Pt calling asking if he can be seen sooner then 10/31/22 with Dr. Garcia Weston for his yearly - C/O of palpitations for the past 2-3 weeks.

## 2022-10-03 ENCOUNTER — HEALTH MAINTENANCE LETTER (OUTPATIENT)
Age: 62
End: 2022-10-03

## 2023-02-11 ENCOUNTER — HEALTH MAINTENANCE LETTER (OUTPATIENT)
Age: 63
End: 2023-02-11

## 2023-05-20 ENCOUNTER — HEALTH MAINTENANCE LETTER (OUTPATIENT)
Age: 63
End: 2023-05-20

## 2023-10-21 ENCOUNTER — HEALTH MAINTENANCE LETTER (OUTPATIENT)
Age: 63
End: 2023-10-21

## 2024-03-09 ENCOUNTER — HEALTH MAINTENANCE LETTER (OUTPATIENT)
Age: 64
End: 2024-03-09

## 2024-07-27 ENCOUNTER — HEALTH MAINTENANCE LETTER (OUTPATIENT)
Age: 64
End: 2024-07-27

## (undated) DEVICE — CATH LAUNCHER 6FR EBU 3.5 LA6EBU35

## (undated) DEVICE — GUIDEWIRE VASC 0.014INX190CM J TIP CGRXT190HJ

## (undated) DEVICE — CLOSURE ANGIOSEAL 6FR 610130

## (undated) DEVICE — DEFIB PRO-PADZ LVP LQD GEL ADULT 8900-2105-01

## (undated) DEVICE — Device

## (undated) DEVICE — CATH BALLOON EMERGE 2.75X15MMH7493918915270

## (undated) DEVICE — INTRO SHEATH 4FRX10CM PINNACLE RSS402

## (undated) DEVICE — RAD INFLATOR BASIC COMPAK  IN4130

## (undated) DEVICE — MANIFOLD KIT ANGIO AUTOMATED 014613

## (undated) DEVICE — INTRODUCER SHEATH GREEN 6.5FRX11CM .038IN PSI-6F-11-038ACT

## (undated) DEVICE — KIT HAND CONTROL ANGIOTOUCH ACIST 65CM AT-P65

## (undated) DEVICE — KIT LG BORE TOUHY ACCESS PLUS MAP152

## (undated) DEVICE — CATH LAUNCHER 6FR EBU 30 LA6EBU30

## (undated) DEVICE — CATH ANGIO INFINITI 3DRC 4FRX100CM 538476

## (undated) DEVICE — CATH GUIDING MICRO STAINLESS PTFE 1.8FRX0.45MM 35-1450

## (undated) DEVICE — GUIDEWIRE VASC 0.035INX150CM INQWIRE J TIP IQ35F150J3F/A

## (undated) DEVICE — CATH DIAG 4FR JL 4.5 538417